# Patient Record
Sex: FEMALE | NOT HISPANIC OR LATINO | Employment: PART TIME | URBAN - METROPOLITAN AREA
[De-identification: names, ages, dates, MRNs, and addresses within clinical notes are randomized per-mention and may not be internally consistent; named-entity substitution may affect disease eponyms.]

---

## 2021-08-27 ENCOUNTER — TRANSFERRED RECORDS (OUTPATIENT)
Dept: HEALTH INFORMATION MANAGEMENT | Facility: CLINIC | Age: 31
End: 2021-08-27

## 2021-08-27 LAB — TSH SERPL-ACNC: 0.14 UIU/ML (ref 0.36–3.74)

## 2021-09-01 ENCOUNTER — TRANSFERRED RECORDS (OUTPATIENT)
Dept: HEALTH INFORMATION MANAGEMENT | Facility: CLINIC | Age: 31
End: 2021-09-01

## 2021-09-01 LAB — PAP SMEAR - HIM PATIENT REPORTED: NEGATIVE

## 2021-09-10 ENCOUNTER — OFFICE VISIT (OUTPATIENT)
Dept: ENDOCRINOLOGY | Facility: CLINIC | Age: 31
End: 2021-09-10
Payer: COMMERCIAL

## 2021-09-10 VITALS — DIASTOLIC BLOOD PRESSURE: 68 MMHG | WEIGHT: 111.3 LBS | SYSTOLIC BLOOD PRESSURE: 128 MMHG | HEART RATE: 84 BPM

## 2021-09-10 DIAGNOSIS — E06.3 HYPOTHYROIDISM DUE TO HASHIMOTO'S THYROIDITIS: Primary | ICD-10-CM

## 2021-09-10 DIAGNOSIS — Z3A.09 9 WEEKS GESTATION OF PREGNANCY: ICD-10-CM

## 2021-09-10 PROCEDURE — 99204 OFFICE O/P NEW MOD 45 MIN: CPT | Performed by: INTERNAL MEDICINE

## 2021-09-10 RX ORDER — LEVOTHYROXINE SODIUM 88 UG/1
1 TABLET ORAL DAILY
COMMUNITY
End: 2021-10-25

## 2021-09-10 RX ORDER — LEVOTHYROXINE SODIUM 100 UG/1
1 TABLET ORAL DAILY
COMMUNITY
End: 2021-10-25

## 2021-09-10 RX ORDER — VITAMIN A ACETATE, BETA CAROTENE, ASCORBIC ACID, CHOLECALCIFEROL, .ALPHA.-TOCOPHEROL ACETATE, DL-, THIAMINE MONONITRATE, RIBOFLAVIN, NIACINAMIDE, PYRIDOXINE HYDROCHLORIDE, FOLIC ACID, CYANOCOBALAMIN, CALCIUM CARBONATE, FERROUS FUMARATE, ZINC OXIDE, CUPRIC OXIDE 3080; 12; 120; 400; 1; 1.84; 3; 20; 22; 920; 25; 200; 27; 10; 2 [IU]/1; UG/1; MG/1; [IU]/1; MG/1; MG/1; MG/1; MG/1; MG/1; [IU]/1; MG/1; MG/1; MG/1; MG/1; MG/1
1 TABLET, FILM COATED ORAL
COMMUNITY
Start: 2021-08-05 | End: 2022-08-05

## 2021-09-10 NOTE — PROGRESS NOTES
Subjective:    New patient    Neetu De Anda is a 31 year old female who presents to discuss management of hypothyroidism in the context of pregnancy.     Currently pregnant (first pregnancy) around 9-10 weeks, with an estimated due date of 4/6/2022.     She has been on thyroid hormone for at least 2-3 years. She was diagnosed with hypothyroidism several years ago with an elevated TSH after presenting with weight gain. She was immediately started on LT4 100 mcg once daily, 7 days per week and after 1 year this was changed to 6 days per week. She had therefore chronically been on levothyroxine 100 mcg once daily, 6 days per week.     No prior H/N radiation. No neck surgery. No prior treatment with radioactive iodine. Never had a thyroid US or known thyroid nodularity. She has no known comorbidities.     Her mother also has hypothyroidism.     She believes her TSH around 2/2020 was 0.32 and at that time she was on LT4 100 mcg 6 days per week, this was the most recent lab check of TSH prior to 8/27/2021.     Following labs done 8/27/2021, labs done about 8 hours after she took LT4:        Following these labs, around 9/3/2021 her dose of levothyroxine was adjusted to 88 mcg once daily, 7 days per week. This was a slight dose increase to 616 mcg/week instead of her previous dose of 600 mcg/week. She takes levothyroxine appropriately to maximize absorption.    She is currently feeling well in the context of her pregnancy.     No tobacco/alcohol use.     Objective:    Appears well. No thyroid eye disease. No surgical scar in the anterior neck. Thyroid examined and atrophic without nodularity. No cervical lymphadenopathy. HR normal rate and rhythm.     Assessment/Plan:    # Hypothyroidism  # Active pregnancy, gestational age around 9-10 weeks    She has had hypothyroidism for the past several years and had been on a stable dose of levothyroxine 100 mcg 6 days/week.  She is currently pregnant and estimates her gestational age  around 9-10 weeks.  OB/GYN care is outside of our system but I was able to review some outside records.  Step 1 is determining the etiology of her hypothyroidism, which I suspect will be autoimmune in nature.  I have ordered a thyroperoxidase antibody.  Step 2 is making sure she is on an appropriate dose of thyroid hormone.  We discussed the physiology of thyroid function during pregnancy and the increased need for thyroid hormone during pregnancy.  We will start with repeat TSH with her upcoming draw next week Friday.  Will subsequently make dose adjustments as needed and I will follow her during her pregnancy.  For now she will continue levothyroxine 88 mcg once daily, 7 days/week.  We discussed the appropriate way to take this medication to maximize absorption, which she is doing.    45 minutes spent on the date of the encounter doing chart review, history and exam, documentation and further activities as noted above.

## 2021-09-10 NOTE — LETTER
9/10/2021         RE: Neetu De Anda  [address] [facility] [address]        Dear Colleague,    Thank you for referring your patient, Neetu De Anda, to the [facility]. Please see a copy of my visit note below.    Subjective:    New patient    Neetu De Anda is a 31 year old female who presents to discuss management of hypothyroidism in the context of pregnancy.     Currently pregnant (first pregnancy) around 9-10 weeks, with an estimated due date of 4/6/2022.     She has been on thyroid hormone for at least 2-3 years. She was diagnosed with hypothyroidism several years ago with an elevated TSH after presenting with weight gain. She was immediately started on LT4 100 mcg once daily, 7 days per week and after 1 year this was changed to 6 days per week. She had therefore chronically been on levothyroxine 100 mcg once daily, 6 days per week.     No prior H/N radiation. No neck surgery. No prior treatment with radioactive iodine. Never had a thyroid US or known thyroid nodularity. She has no known comorbidities.     Her mother also has hypothyroidism.     She believes her TSH around 2/2020 was 0.32 and at that time she was on LT4 100 mcg 6 days per week, this was the most recent lab check of TSH prior to 8/27/2021.     Following labs done 8/27/2021, labs done about 8 hours after she took LT4:        Following these labs, around 9/3/2021 her dose of levothyroxine was adjusted to 88 mcg once daily, 7 days per week. This was a slight dose increase to 616 mcg/week instead of her previous dose of 600 mcg/week. She takes levothyroxine appropriately to maximize absorption.    She is currently feeling well in the context of her pregnancy.     No tobacco/alcohol use.     Objective:    Appears well. No thyroid eye disease. No surgical scar in the anterior neck. Thyroid examined and atrophic without nodularity. No cervical lymphadenopathy. HR normal rate and rhythm.     Assessment/Plan:    #  Hypothyroidism  # Active pregnancy, gestational age around 9-10 weeks    She has had hypothyroidism for the past several years and had been on a stable dose of levothyroxine 100 mcg 6 days/week.  She is currently pregnant and estimates her gestational age around 9-10 weeks.  OB/GYN care is outside of our system but I was able to review some outside records.  Step 1 is determining the etiology of her hypothyroidism, which I suspect will be autoimmune in nature.  I have ordered a thyroperoxidase antibody.  Step 2 is making sure she is on an appropriate dose of thyroid hormone.  We discussed the physiology of thyroid function during pregnancy and the increased need for thyroid hormone during pregnancy.  We will start with repeat TSH with her upcoming draw next week Friday.  Will subsequently make dose adjustments as needed and I will follow her during her pregnancy.  For now she will continue levothyroxine 88 mcg once daily, 7 days/week.  We discussed the appropriate way to take this medication to maximize absorption, which she is doing.    45 minutes spent on the date of the encounter doing chart review, history and exam, documentation and further activities as noted above.       Again, thank you for allowing me to participate in the care of your patient.        Sincerely,        Jose Anders MD

## 2021-09-17 ENCOUNTER — TELEPHONE (OUTPATIENT)
Dept: ENDOCRINOLOGY | Facility: CLINIC | Age: 31
End: 2021-09-17

## 2021-09-17 ENCOUNTER — TRANSFERRED RECORDS (OUTPATIENT)
Dept: HEALTH INFORMATION MANAGEMENT | Facility: CLINIC | Age: 31
End: 2021-09-17

## 2021-09-17 LAB — HEPATITIS B SURFACE ANTIGEN (EXTERNAL): NONREACTIVE

## 2021-09-17 NOTE — TELEPHONE ENCOUNTER
Called and left message for patient to return call. Patient saw Dr. Anders on 9/10/21 and was suppose to get labs drawn at her OBGYN clinic. Writer just wanted an update to see if she was able to get labs done.

## 2021-09-21 ENCOUNTER — DOCUMENTATION ONLY (OUTPATIENT)
Dept: ENDOCRINOLOGY | Facility: CLINIC | Age: 31
End: 2021-09-21

## 2021-09-21 ENCOUNTER — TELEPHONE (OUTPATIENT)
Dept: ENDOCRINOLOGY | Facility: CLINIC | Age: 31
End: 2021-09-21

## 2021-09-21 DIAGNOSIS — Z3A.09 9 WEEKS GESTATION OF PREGNANCY: ICD-10-CM

## 2021-09-21 DIAGNOSIS — E06.3 HYPOTHYROIDISM DUE TO HASHIMOTO'S THYROIDITIS: Primary | ICD-10-CM

## 2021-09-21 NOTE — TELEPHONE ENCOUNTER
Called and spoke with patient. Patient will check her schedule and call the clinic back to schedule her lab appt and follow up appt.     Patient will need a lab appointment in 4 weeks and then a follow up with Dr. Anders in 1-2 days.

## 2021-09-21 NOTE — PROGRESS NOTES
TSH returned at 2.1 on 9/17/2021.    She has been on levothyroxine 88 mcg once daily, 7 days per week.    She will increase to 88 mcg 8 tabs per week, so 2 days per week (ex. Wed. And Saturday she will take 1.5 tabs).    TSH and visit with me in 4 weeks.    I spoke with Neetu over the phone this afternoon to convey the plan.

## 2021-09-21 NOTE — TELEPHONE ENCOUNTER
Dr. Anders had ordered TSH and Thyroid peroxidase antibody on 9/10/21 but we only received the TSH result. Writer called and left [facility] medical records to fax the Thyroid peroxidase antibody result over to [phone].

## 2021-09-29 NOTE — TELEPHONE ENCOUNTER
Date: 10/21/2021 Status: Scheduled   Time: 8:00 AM Length: 15   Visit Type: MYCHART LAB VISIT [833] Copay: $0.00   Provider: [facility]

## 2021-10-06 NOTE — TELEPHONE ENCOUNTER
Date: 10/25/2021 Status: Scheduled   Time: 3:00 PM Length: 30   Visit Type: RETURN ENDOCRINE [[id number]] Copay: $0.00   Provider: Jose Anders MD [facility]   Bill Area: Endocrinology [facility]

## 2021-10-17 ENCOUNTER — HEALTH MAINTENANCE LETTER (OUTPATIENT)
Age: 31
End: 2021-10-17

## 2021-10-21 ENCOUNTER — LAB (OUTPATIENT)
Dept: LAB | Facility: CLINIC | Age: 31
End: 2021-10-21
Payer: COMMERCIAL

## 2021-10-21 DIAGNOSIS — Z3A.09 9 WEEKS GESTATION OF PREGNANCY: ICD-10-CM

## 2021-10-21 DIAGNOSIS — E06.3 HYPOTHYROIDISM DUE TO HASHIMOTO'S THYROIDITIS: ICD-10-CM

## 2021-10-21 PROCEDURE — 84443 ASSAY THYROID STIM HORMONE: CPT

## 2021-10-21 PROCEDURE — 36415 COLL VENOUS BLD VENIPUNCTURE: CPT

## 2021-10-21 PROCEDURE — 86376 MICROSOMAL ANTIBODY EACH: CPT

## 2021-10-22 LAB
THYROPEROXIDASE AB SERPL-ACNC: 27 IU/ML
TSH SERPL DL<=0.005 MIU/L-ACNC: 3.9 MU/L (ref 0.4–4)

## 2021-10-25 ENCOUNTER — VIRTUAL VISIT (OUTPATIENT)
Dept: ENDOCRINOLOGY | Facility: CLINIC | Age: 31
End: 2021-10-25
Payer: COMMERCIAL

## 2021-10-25 DIAGNOSIS — E06.3 HYPOTHYROIDISM DUE TO HASHIMOTO'S THYROIDITIS: Primary | ICD-10-CM

## 2021-10-25 DIAGNOSIS — Z3A.16 16 WEEKS GESTATION OF PREGNANCY: ICD-10-CM

## 2021-10-25 PROCEDURE — 99213 OFFICE O/P EST LOW 20 MIN: CPT | Mod: 95 | Performed by: INTERNAL MEDICINE

## 2021-10-25 RX ORDER — LEVOTHYROXINE SODIUM 88 UG/1
TABLET ORAL
Qty: 90 TABLET | Refills: 3 | Status: SHIPPED | OUTPATIENT
Start: 2021-10-25 | End: 2021-12-06

## 2021-10-25 NOTE — PROGRESS NOTES
Phone visit    Start time 2:58, stop time 3:10; additional 10 minutes spent on the date of the encounter doing chart review, documentation and further activities as noted.     Provider location: [facility], [address] [place] [address] [address] [id number]    Patient location: patient home    Mode of transmission: phone    Subjective:    Established patient    Neetu De Anda is a 31 year old female who presents to review hypothyroidism in pregnancy.    Her prepregnancy dose of levothyroxine was 600 mcg/week.  On September 21, 2021 when she was around 11/12 weeks gestational age we increased the dose of levothyroxine to 704 mcg/week (due to TSH of 2.1 on 9/17/2021) and she achieved this by taking levothyroxine 88 mcg 8 tablets/week, which she has done to date.    She has not missed any doses and she takes levothyroxine appropriately to maximize absorption.  She does take a prenatal multivitamin and another vitamin but takes these at least 4 hours after taking thyroid hormone.  TSH on 10/21/2021 was 3.9 and TPO Ab was normal.    Pregnancy has been uncomplicated.  No nausea, vomiting, or GERD.    Objective:    Phone visit.    Assessment/Plan:    # Hypothyroidism  # Active pregnancy, gestational age around 16 weeks     With her TSH of 3.9 we will slightly increase her dose of thyroid hormone.  She will increase from levothyroxine 88 mcg 8 tablets/week to levothyroxine 88 mcg 9 tablets/week.  2 days/week, example Wednesday and Saturday, she will take 2 tablets each morning.  The rest of the days per week she will take 1 tablet.    We will recheck her TSH in 4 weeks with a return visit.    We again reviewed the appropriate way to take thyroid hormone to maximize absorption, which she is doing.  We discussed that if she develops GERD and starts taking Tums or an antacid she should also take these 4 hours apart from thyroid hormone.

## 2021-10-25 NOTE — LETTER
10/25/2021         RE: Neetu De Anda  [address] [place] [facility] [address]        Dear Colleague,    Thank you for referring your patient, Neetu De Anda, to the [facility]. Please see a copy of my visit note below.    Phone visit    Start time 2:58, stop time 3:10; additional 10 minutes spent on the date of the encounter doing chart review, documentation and further activities as noted.     Provider location: [facility], [address] [place] [id number]    Patient location: patient home    Mode of transmission: phone    Subjective:    Established patient    Neetu De Anda is a 31 year old female who presents to review hypothyroidism in pregnancy.    Her prepregnancy dose of levothyroxine was 600 mcg/week.  On September 21, 2021 when she was around 11/12 weeks gestational age we increased the dose of levothyroxine to 704 mcg/week (due to TSH of 2.1 on 9/17/2021) and she achieved this by taking levothyroxine 88 mcg 8 tablets/week, which she has done to date.    She has not missed any doses and she takes levothyroxine appropriately to maximize absorption.  She does take a prenatal multivitamin and another vitamin but takes these at least 4 hours after taking thyroid hormone.  TSH on 10/21/2021 was 3.9 and TPO Ab was normal.    Pregnancy has been uncomplicated.  No nausea, vomiting, or GERD.    Objective:    Phone visit.    Assessment/Plan:    # Hypothyroidism  # Active pregnancy, gestational age around 16 weeks     With her TSH of 3.9 we will slightly increase her dose of thyroid hormone.  She will increase from levothyroxine 88 mcg 8 tablets/week to levothyroxine 88 mcg 9 tablets/week.  2 days/week, example Wednesday and Saturday, she will take 2 tablets each morning.  The rest of the days per week she will take 1 tablet.    We will recheck her TSH in 4 weeks with a return visit.    We again reviewed the appropriate way to take thyroid hormone to maximize  absorption, which she is doing.  We discussed that if she develops GERD and starts taking Tums or an antacid she should also take these 4 hours apart from thyroid hormone.      Again, thank you for allowing me to participate in the care of your patient.        Sincerely,        Jose Anders MD

## 2021-11-20 DIAGNOSIS — E06.3 HYPOTHYROIDISM DUE TO HASHIMOTO'S THYROIDITIS: ICD-10-CM

## 2021-11-24 ENCOUNTER — LAB (OUTPATIENT)
Dept: LAB | Facility: CLINIC | Age: 31
End: 2021-11-24
Payer: COMMERCIAL

## 2021-11-24 DIAGNOSIS — E06.3 HYPOTHYROIDISM DUE TO HASHIMOTO'S THYROIDITIS: ICD-10-CM

## 2021-11-24 DIAGNOSIS — Z3A.16 16 WEEKS GESTATION OF PREGNANCY: ICD-10-CM

## 2021-11-24 LAB — TSH SERPL DL<=0.005 MIU/L-ACNC: 1.96 MU/L (ref 0.4–4)

## 2021-11-24 PROCEDURE — 84443 ASSAY THYROID STIM HORMONE: CPT

## 2021-11-24 PROCEDURE — 36415 COLL VENOUS BLD VENIPUNCTURE: CPT

## 2021-11-26 RX ORDER — LEVOTHYROXINE SODIUM 88 UG/1
TABLET ORAL
Qty: 36 TABLET | Refills: 9 | OUTPATIENT
Start: 2021-11-26

## 2022-01-12 ENCOUNTER — LAB (OUTPATIENT)
Dept: LAB | Facility: CLINIC | Age: 32
End: 2022-01-12
Payer: COMMERCIAL

## 2022-01-12 DIAGNOSIS — E06.3 HYPOTHYROIDISM DUE TO HASHIMOTO'S THYROIDITIS: ICD-10-CM

## 2022-01-12 DIAGNOSIS — Z3A.09 9 WEEKS GESTATION OF PREGNANCY: ICD-10-CM

## 2022-01-12 LAB — TSH SERPL DL<=0.005 MIU/L-ACNC: 0.6 MU/L (ref 0.4–4)

## 2022-01-12 PROCEDURE — 84443 ASSAY THYROID STIM HORMONE: CPT

## 2022-01-12 PROCEDURE — 36415 COLL VENOUS BLD VENIPUNCTURE: CPT

## 2022-01-17 DIAGNOSIS — E06.3 HYPOTHYROIDISM DUE TO HASHIMOTO'S THYROIDITIS: Primary | ICD-10-CM

## 2022-01-21 ENCOUNTER — TRANSFERRED RECORDS (OUTPATIENT)
Dept: HEALTH INFORMATION MANAGEMENT | Facility: CLINIC | Age: 32
End: 2022-01-21
Payer: COMMERCIAL

## 2022-01-25 ENCOUNTER — TELEPHONE (OUTPATIENT)
Dept: ADMINISTRATIVE | Facility: CLINIC | Age: 32
End: 2022-01-25
Payer: COMMERCIAL

## 2022-01-25 NOTE — TELEPHONE ENCOUNTER
Diabetes Education Scheduling Outreach #1:    Call to patient to schedule. Left message with phone number to call to schedule.    Plan for 2nd outreach attempt within 1 business day.    Raeann Alonso OnCall  Diabetes and Nutrition Scheduling

## 2022-02-03 ENCOUNTER — VIRTUAL VISIT (OUTPATIENT)
Dept: EDUCATION SERVICES | Facility: CLINIC | Age: 32
End: 2022-02-03
Payer: COMMERCIAL

## 2022-02-03 DIAGNOSIS — O24.419 GDM (GESTATIONAL DIABETES MELLITUS): Primary | ICD-10-CM

## 2022-02-03 PROCEDURE — G0108 DIAB MANAGE TRN  PER INDIV: HCPCS | Mod: 95

## 2022-02-03 NOTE — PATIENT INSTRUCTIONS
Your 1 week follow-up Diabetes Education visit is scheduled on Wednesday, February 9th, in person, at the [facility] at 7:30am.    1. Check blood sugar 4 times a day, before breakfast and 1 hour after the start of each meal.   Blood glucose goal before breakfast: <95 mg/dL  1 hour after start of meals:  <140 mg/dL    2. Check urine ketones when you wake up every morning for 7 days. If negative everyday, reduce testing to once a week.    3. Follow the recommended meal plan: eat something every 2-3 hours, include protein/fat and carbohydrate at every meal and snack, have 30-45 gm carbs at breakfast, 45-60 gm carbs at lunch, 45-60 gm carbs at supper, 15-30 gm carbs at 3 snacks per day.     Other tips:  -Minimum 175 gm carbohydrates a day recommended during pregnancy.  -Caution fruit, cereal and juice in the morning- may cause a high blood glucose.  -Good to eat protein with bedtime snack   -We recommend keeping track of meals the next 1-2 weeks so you can send for us to review if you are having some higher blood glucose.    Helpful website: calorieking.com    4. Add activity to every day, try walking or being active after each meal to help control blood sugar levels.    5.Call or send a [employer] message to your educator if 3 or more blood sugars are above goal in 1 week, you have an elevated ketone result (trace or higher), or with questions or concerns.    Eva Dunham,  MICHELLE, LD, [facility]   [phone]

## 2022-02-03 NOTE — PROGRESS NOTES
At 10:06am, text and email invite sent. Patient not already joined in video visit by 10:05am.  Joined at 10:08am edn 11:09am    Type of service:  Video Visit    If the video visit is dropped, the video visit invitation should be resent by: Send to e-mail at: [email or web address]    Originating Location (pt. Location): Home  Distant Location (provider location): Home  Mode of Communication:  Video Conference via [employer]    Video Start Time: 10:08am  Video End Time (time video stopped): 11:09am    How would patient like to obtain AVS? [facility]      Diabetes Self-Management Education & Support    SUBJECTIVE/OBJECTIVE:  Presents for education related to gestational diabetes.    Accompanied by: Self,Spouse  Diabetes management related comments/concerns: Says she is checking BG 4 times a day.  She saw a higher BG after having a booster shot.  Says checking BG 4x/day.    Says cut back on chapatis, eat more meat and pulses/whole grains and now eating oat bread and less sugar.  Says she used to drink 1 cup milk with protein powder.      Wt: 51kg and now 59 kg    Gestational weeks: 31 weeks  Number of previous pregnancies: 0  Had any babies over 9 lbs: No  Previously had Gestational Diabetes: No  Have you ever had thyroid problems or taken thyroid medication?: (!) Yes  Heart disease, mitral valve prolapse or rheumatic fever?: No  Hypertension : No  High Cholesterol: No  High Triglycerides: No  Do you use tobacco products?: No  Do you drink beer, wine or hard liquor?: No    Cultural Influences/Ethnic Background:  Choose not to answer    Estimated Date of Delivery: April 6, 2022    1 hour OGTT  No results found for: GLU1      3 hour OGTT    Fasting  No results found for: GTTGF    1 hour  No results found for: GTTG1    2 hour  No results found for: GTTG2    3 hour  No results found for: GTTG3    Lifestyle and Health Behaviors:  Exercise:: Yes  Days per week of moderate to strenuous exercise (like a brisk walk): 5  On  average, minutes per day of exercise at this level: 20 (20 minutes AM, 10 minutes after lunch and 20 minutes evening.)  How intense was your typical exercise? : Moderate (like brisk walking)  Exercise Minutes per Week: 100  Barrier to exercise: None  Cultural/Rastafari diet restrictions?: Yes (Not eat beef or pork)  Meal planning/habits: Low carb  Meals include: Breakfast,Lunch,Dinner,Morning Snack,Afternoon Snack,Evening Snack  Breakfast: Wakes: 7-7:30am: B: soaked almonds, raisins and walnuts. 9-10am: oats OR egg with 1 slice oat bread and water  Lunch: meat and 1 chapatis, 1 cup cooked maddison and okra and water  Dinner: meat with 1 chaptis, 1 cup cooked maddison and okra  gm(2/3 cup) cooked rice, 1 cup pulse, green veggie and meat  OR rice and allen and water  Snacks: fruit OR yogurt with blueberry and 1 apple OR apodaca nuts  Other: milk and protein powder  Beverages: Water,Milk,Tea (Rod on occasion)  Biggest challenges to healthy eating: None  Pre-juan carlos vitamin?: Yes  Supplements?: Yes  List supplements currently taking: Slow Fe  Experiencing nausea?: No  Experiencing heartburn?: No    Healthy Coping:  Emotional response to diabetes: Ready to learn,Acceptance  Informal Support system:: Spouse  Stage of change: ACTION (Actively working towards change)    Current Management:  Taking medications for gestational diabetes?: No  Difficulty affording diabetes testing supplies?: No    ASSESSMENT:  Reviewed target blood glucose values, sharps disposal, diagnosis criteria for GDM and importance of good blood glucose management for health of mom and baby. Patient advised to call if 3 blood glucose elevated before returns next week. Instructed on ketone checking and told to call if unable to get ketones negative.      Discussed carbohydrate sources and impact on blood glucose. Reviewed basics of healthy eating and incorporating a variety of foods into meal plan. Instructed on carbohydrate counting and label reading and  recommended patient consume 2-3 CHO for breakfast, 3-4 CHO for lunch and dinner and 1-2 CHO for each snack, 3 snacks a day.  Answered her food related questions and discussed different meal ideas to include the foods she normally eats as she was cutting out rice due to concerns she should not be eating this.  Whole grains were encouraged as able as she asks about quinoa and millet.    Discussed importance of not going too low in carbohydrates since that may cause liver to produce excess glucose and contribute to elevated blood glucose readings and ketone formation. Encouraged eating breakfast within 1 hour of waking.  To also help prevent against ketone formation, protein was encouraged with meals and snacks, especially with the night snack. Reviewed benefits of exercising to help lower blood glucose and walking after meals, as tolerated and per MD approval, if seeing elevated blood glucose after a meal. Pt verbalized understanding of concepts discussed and recommendations provided.        INTERVENTION:  Patient was already using the Accu-Chek Guide meter and provided blood glucose values:      Blood Glucose/Ketone Log:    Date Fasting 1 hr Post Breakfast 1 hr Post Lunch 1 hr Post Supper   2/3 87      2/2 87 125 100 105   2/1 84 122 136 119   1/31 87 130 107 139   1/30 105 128 85 102   1/29 87 120 108 151 (booster)   1/28  139 145 125       So far, seeing most blood glucose in target- 83% of the time for fasting, post-lunch and dinner and 100% post-breakfast.    Educational topics covered today:  GDM diagnosis, pathophysiology, Risks and Complications of GDM, Means of controlling GDM, Using a Blood Glucose Monitor, Blood Glucose Goals, Logging and Interpreting Glucose Results, Ketone Testing, When to Call a Diabetes Educator or OB Provider, Healthy Eating During Pregnancy, Counting Carbohydrates, Meal Planning for GDM, and Physical Activity    Educational materials provided today:   [place] Understanding  Gestational Diabetes  GDM Log Book  Sharps Disposal  (email received)  Sent Carbohydrate Information on Asian  foods by [employer]    Pt verbalized understanding of concepts discussed and recommendations provided today.     PLAN:  Check glucose 4 times daily, before breakfast and 1 hour after each meal.     Check Ketones daily for one week, if negative, reduce testing to once a week.     Physical activity recommended: after meals if blood glucose high, as tolerated and per MD approval.    Meal plan: 2-3 carbs at breakfast, 3-4 carbs at lunch, 3-4 carbs at supper, 1-2 carbs at 3 snacks a day.  Follow consistent CHO meal plan, eat CHO and protein/fat at all meals/snacks.    Call/e-mail/[employer] message diabetes educator if 3 or more blood sugars are above the goal in 1 week, if ketones are positive, or with questions/concerns.    Eva Dunham RDN, LD, [facility]   Time Spent: 61 minutes  Encounter Type: Individual    Any diabetes medication dose changes were made via the CDE Protocol and Collaborative Practice Agreement with the patient's referring provider. A copy of this encounter was shared with the provider.

## 2022-02-09 ENCOUNTER — ALLIED HEALTH/NURSE VISIT (OUTPATIENT)
Dept: EDUCATION SERVICES | Facility: CLINIC | Age: 32
End: 2022-02-09
Payer: COMMERCIAL

## 2022-02-09 DIAGNOSIS — O24.419 GDM (GESTATIONAL DIABETES MELLITUS): Primary | ICD-10-CM

## 2022-02-09 PROCEDURE — G0108 DIAB MANAGE TRN  PER INDIV: HCPCS

## 2022-02-09 NOTE — PROGRESS NOTES
Diabetes Self-Management Education & Support    SUBJECTIVE/OBJECTIVE:  Presents for education related to gestational diabetes.    Diabetes management related comments/concerns: No    Cultural Influences/Ethnic Background:  Choose not to answer    There were no vitals taken for this visit.      Estimated Date of Delivery: Data Unavailable    Blood Glucose/Ketone Log:   Date Ketones Fasting Post Breakfast Post Lunch Post Supper   2/4 - - 125 99 110   2/5 n 84 119 105 107   2/6 n 80 125 140 107   2/7 tr 88 135 112 105   2/8 n 90 113 132 145   2/9 n 86                  Lifestyle and Health Behaviors:  Days per week of moderate to strenuous exercise (like a brisk walk): 0  On average, minutes per day of exercise at this level: 60  How intense was your typical exercise? : Light (like stretching or slow walking)  Exercise Minutes per Week: 0  Barrier to exercise: None,Safety,Physical limitation  Cultural/Lutheran diet restrictions?: Yes  Meal planning/habits: Carb counting,Low carb,Smaller portions,Frequent snacking  How many times a week on average do you eat food made away from home (restaurant/take-out)?: 0  Meals include: Breakfast,Lunch,Dinner,Morning Snack,Afternoon Snack,Evening Snack  Beverages: Water,Milk          Healthy Coping:  Informal Support system:: Family,Spouse    Current Management:       ASSESSMENT:  Ketones: net-trace.   Fasting blood glucoses: 100% in target.  After breakfast: 100% in target.  After lunch: 100% in target.  After dinner: 80% in target.    Glucose in goal, she did have one high reading after dinner but is able to identify having several snacks prior to her dinner meal may of contributed to the higher reading.   She is following the meal plan, but lower carbohydrates at some meals then having additional snacks. Bedtime is 15 grams of carbohydrate. At this time her fasting glucose is in goal and ketones are normal. Discussed with her the benefit of adequate bedtime snack especially as  the pregnancy progresses.     INTERVENTION:  Educational topics covered today:  What to expect after delivery, Future testing for Type 2 diabetes (2 hour OGTT at 6 week post-partum check-up and annual fasting blood glucose level), Risk of GDM and planning ahead for future pregnancies, Recommended lifestyle interventions for reducing the risk of Type 2 Diabetes, When to Call a Diabetes Educator or OB Provider    Educational Materials provided today:  [facility] Preventing Diabetes    PLAN:  Check glucose 4 times daily.  Check ketones once a week when readings are consistently negative.  Continue with recommended physical activity.  Continue to follow recommended meal plan: 2-3 carbs at breakfast, 3-4 carbs at lunch, 3-4 carbs at supper, 1-2 carbs at snacks.  Follow consistent CHO meal plan, eat CHO and protein/fat at all meals/snacks.    Call/e-mail/MyChart message diabetes educator if 3 or more blood sugars are above the goal in 1 week or if ketones are positive.    Angelina Yuen RN, [facility]    Time Spent: 40 minutes  Encounter Type: Individual    Any diabetes medication dose changes were made via the CDE Protocol and Collaborative Practice Agreement with the patient's referring provider. A copy of this encounter was shared with the provider.

## 2022-02-09 NOTE — LETTER
2/9/2022         RE: Neetu De Anda  [address]  [place] [address]        Dear Colleague,    Thank you for referring your patient, Neetu De Anda, to the [facility]. Please see a copy of my visit note below.    Diabetes Self-Management Education & Support    SUBJECTIVE/OBJECTIVE:  Presents for education related to gestational diabetes.    Diabetes management related comments/concerns: No    Cultural Influences/Ethnic Background:  Choose not to answer    There were no vitals taken for this visit.      Estimated Date of Delivery: Data Unavailable    Blood Glucose/Ketone Log:   Date Ketones Fasting Post Breakfast Post Lunch Post Supper   2/4 - - 125 99 110   2/5 n 84 119 105 107   2/6 n 80 125 140 107   2/7 tr 88 135 112 105   2/8 n 90 113 132 145   2/9 n 86                  Lifestyle and Health Behaviors:  Days per week of moderate to strenuous exercise (like a brisk walk): 0  On average, minutes per day of exercise at this level: 60  How intense was your typical exercise? : Light (like stretching or slow walking)  Exercise Minutes per Week: 0  Barrier to exercise: None,Safety,Physical limitation  Cultural/Pentecostal diet restrictions?: Yes  Meal planning/habits: Carb counting,Low carb,Smaller portions,Frequent snacking  How many times a week on average do you eat food made away from home (restaurant/take-out)?: 0  Meals include: Breakfast,Lunch,Dinner,Morning Snack,Afternoon Snack,Evening Snack  Beverages: Water,Milk          Healthy Coping:  Informal Support system:: Family,Spouse    Current Management:       ASSESSMENT:  Ketones: net-trace.   Fasting blood glucoses: 100% in target.  After breakfast: 100% in target.  After lunch: 100% in target.  After dinner: 80% in target.    Glucose in goal, she did have one high reading after dinner but is able to identify having several snacks prior to her dinner meal may of contributed to the higher reading.   She is following the meal  plan, but lower carbohydrates at some meals then having additional snacks. Bedtime is 15 grams of carbohydrate. At this time her fasting glucose is in goal and ketones are normal. Discussed with her the benefit of adequate bedtime snack especially as the pregnancy progresses.     INTERVENTION:  Educational topics covered today:  What to expect after delivery, Future testing for Type 2 diabetes (2 hour OGTT at 6 week post-partum check-up and annual fasting blood glucose level), Risk of GDM and planning ahead for future pregnancies, Recommended lifestyle interventions for reducing the risk of Type 2 Diabetes, When to Call a Diabetes Educator or OB Provider    Educational Materials provided today:  [facility] Preventing Diabetes    PLAN:  Check glucose 4 times daily.  Check ketones once a week when readings are consistently negative.  Continue with recommended physical activity.  Continue to follow recommended meal plan: 2-3 carbs at breakfast, 3-4 carbs at lunch, 3-4 carbs at supper, 1-2 carbs at snacks.  Follow consistent CHO meal plan, eat CHO and protein/fat at all meals/snacks.    Call/e-mail/MyChart message diabetes educator if 3 or more blood sugars are above the goal in 1 week or if ketones are positive.    Angelina Yuen RN, [facility]    Time Spent: 40 minutes  Encounter Type: Individual    Any diabetes medication dose changes were made via the CDE Protocol and Collaborative Practice Agreement with the patient's referring provider. A copy of this encounter was shared with the provider.

## 2022-02-18 ENCOUNTER — MYC MEDICAL ADVICE (OUTPATIENT)
Dept: NUTRITION | Facility: CLINIC | Age: 32
End: 2022-02-18
Payer: COMMERCIAL

## 2022-02-21 NOTE — TELEPHONE ENCOUNTER
Gestational Diabetes Follow-up    Subjective/Objective:    Neetu De Anda sent in blood glucose log for review. Last date of communication was: 2-9-22.    Gestational diabetes is being managed with diet and activity    Taking diabetes medications: no    Estimated Date of Delivery: 4-6-22    BG/Food Log:         Assessment:    Ketones: Negative.   Fasting blood glucoses: 100% in target.  After breakfast: 90% in target.  Before lunch: x% in target.  After lunch: 100% in target.  Before dinner: x% in target.  After dinner: 100% in target.    Plan/Response:  Reduce ketone checks to once a week.  Follow-up in 2 weeks.  MyChart response sent    Vijaya Perez RN, [facility]      Any diabetes medication dose changes were made via the CDE Protocol and Collaborative Practice Agreement with the patient's OB/GYN provider. A copy of this encounter was shared with the provider.

## 2022-02-25 ENCOUNTER — HOSPITAL ENCOUNTER (OUTPATIENT)
Age: 32
Setting detail: OBSERVATION
End: 2022-02-25
Payer: COMMERCIAL

## 2022-03-04 ENCOUNTER — MYC MEDICAL ADVICE (OUTPATIENT)
Dept: NUTRITION | Facility: CLINIC | Age: 32
End: 2022-03-04
Payer: COMMERCIAL

## 2022-03-04 NOTE — TELEPHONE ENCOUNTER
Gestational Diabetes Follow-up    Subjective/Objective:    Neetu De Anda sent in blood glucose log for review. Last date of communication was: 2/18/22.    Gestational diabetes is being managed with diet and activity    Taking diabetes medications: no    Estimated Date of Delivery: Data Unavailable 4/6/22    BG/Food Log:           Assessment:  Blood sugars in target, no changes needed.     Ketones: negative.   Fasting blood glucoses: 94% in target.  After breakfast: 100% in target.  After lunch: 100% in target.  After dinner: 100% in target.    Plan/Response:  No changes in the patient's current treatment plan.  Follow-up in 2 weeks.    Oxana Yi MS, RD, LD, CDE    Any diabetes medication dose changes were made via the CDE Protocol and Collaborative Practice Agreement with the patient's OB/GYN provider. A copy of this encounter was shared with the provider.

## 2022-03-21 ENCOUNTER — MYC MEDICAL ADVICE (OUTPATIENT)
Dept: NUTRITION | Facility: CLINIC | Age: 32
End: 2022-03-21
Payer: COMMERCIAL

## 2022-03-22 NOTE — TELEPHONE ENCOUNTER
Gestational Diabetes Follow-up    Subjective/Objective:    Neetu De Anda sent in blood glucose log for review. Last date of communication was: 3/4/2022.    Gestational diabetes is being managed with diet and activity    Taking diabetes medications: no    Estimated Date of Delivery: Data Unavailable    BG/Food Log:         Assessment:    Ketones: neg.   Fasting blood glucoses: 100% in target.  After breakfast: 100% in target.  After lunch: 100% in target.  After dinner: 100% in target.    Plan/Response:  No changes in the patient's current treatment plan.  Follow-up in 2 weeks.    HERMES Lovell CDCES    Any diabetes medication dose changes were made via the CDE Protocol and Collaborative Practice Agreement with the patient's OB/GYN provider. A copy of this encounter was shared with the provider.

## 2022-03-28 ENCOUNTER — TELEPHONE (OUTPATIENT)
Dept: EDUCATION SERVICES | Facility: CLINIC | Age: 32
End: 2022-03-28
Payer: COMMERCIAL

## 2022-03-28 NOTE — TELEPHONE ENCOUNTER
Neetu De Anda Diab Ed-Patient Care  Today morning I got ketones pink in color, I think it s 15g     Do I need to do anything about it.   Is this bad for baby?     Neetu Andrew Response:  Wiley De Anda,    Thank you for reaching out about the small ketones (15). When you have ketones it means that you are not getting in enough carbohydrates. If you think you are still getting enough during the day then you could add a small carbohydrate snack in the middle of the night. Most patients do this when they get up to use the bathroom. Usually 10-15 grams of carbs is enough. You could try a glass of milk, small granola bar, or some crackers with PB or cheese.    The ketones are not hurting baby. When they are positive like you had this morning it is just your body's way of letting you know you need more carbohydrates.

## 2022-04-06 ENCOUNTER — MYC MEDICAL ADVICE (OUTPATIENT)
Dept: NUTRITION | Facility: CLINIC | Age: 32
End: 2022-04-06
Payer: COMMERCIAL

## 2022-04-07 NOTE — TELEPHONE ENCOUNTER
Gestational Diabetes Follow-up    Subjective/Objective:    Neetu De Anda sent in blood glucose log for review. Last date of communication was: 3/28/22.    Gestational diabetes is being managed with diet and activity    Taking diabetes medications: no    Estimated Date of Delivery: Data Unavailable- 4/6/2022    BG/Food Log:   Seem message    Assessment:  Blood sugars in target and patient is past her due date.     Ketones: trace.   Fasting blood glucoses: 100% in target.  After breakfast: 100% in target.  After lunch: 100% in target.  After dinner: 100% in target.    Plan/Response:  No changes in the patient's current treatment plan.    No follow up needed unless questions.    Oxana Yi MS, RD, LD, CDE      Any diabetes medication dose changes were made via the CDE Protocol and Collaborative Practice Agreement with the patient's OB/GYN provider. A copy of this encounter was shared with the provider.

## 2022-04-11 ENCOUNTER — APPOINTMENT (OUTPATIENT)
Dept: ULTRASOUND IMAGING | Facility: CLINIC | Age: 32
End: 2022-04-11
Attending: OBSTETRICS & GYNECOLOGY
Payer: COMMERCIAL

## 2022-04-11 ENCOUNTER — HOSPITAL ENCOUNTER (INPATIENT)
Facility: CLINIC | Age: 32
LOS: 3 days | Discharge: HOME | End: 2022-04-14
Attending: OBSTETRICS & GYNECOLOGY | Admitting: OBSTETRICS & GYNECOLOGY
Payer: COMMERCIAL

## 2022-04-11 DIAGNOSIS — O36.4XX0 IUFD AT 20 WEEKS OR MORE OF GESTATION: Primary | ICD-10-CM

## 2022-04-11 PROBLEM — O36.8190 DECREASED FETAL MOVEMENT: Status: ACTIVE | Noted: 2022-04-11

## 2022-04-11 LAB
ABO/RH(D): NORMAL
ALBUMIN SERPL-MCNC: 3 G/DL (ref 3.4–5)
ALP SERPL-CCNC: 169 U/L (ref 40–150)
ALT SERPL W P-5'-P-CCNC: 22 U/L (ref 0–50)
ANION GAP SERPL CALCULATED.3IONS-SCNC: 13 MMOL/L (ref 3–14)
ANTIBODY SCREEN: NEGATIVE
APTT PPP: 28 SECONDS (ref 22–38)
AST SERPL W P-5'-P-CCNC: 32 U/L (ref 0–45)
BILIRUB SERPL-MCNC: 0.2 MG/DL (ref 0.2–1.3)
BUN SERPL-MCNC: 12 MG/DL (ref 7–30)
CALCIUM SERPL-MCNC: 9.4 MG/DL (ref 8.5–10.1)
CHLORIDE BLD-SCNC: 111 MMOL/L (ref 94–109)
CO2 SERPL-SCNC: 15 MMOL/L (ref 20–32)
CREAT SERPL-MCNC: 0.78 MG/DL (ref 0.52–1.04)
ERYTHROCYTE [DISTWIDTH] IN BLOOD BY AUTOMATED COUNT: 14 % (ref 10–15)
FIBRINOGEN PPP-MCNC: 484 MG/DL (ref 170–490)
GFR SERPL CREATININE-BSD FRML MDRD: >90 ML/MIN/1.73M2
GLUCOSE BLD-MCNC: 106 MG/DL (ref 70–99)
GLUCOSE BLDC GLUCOMTR-MCNC: 106 MG/DL (ref 70–99)
HCT VFR BLD AUTO: 38.8 % (ref 35–47)
HGB BLD-MCNC: 13.2 G/DL (ref 11.7–15.7)
INR PPP: 0.89 (ref 0.85–1.15)
MCH RBC QN AUTO: 29.3 PG (ref 26.5–33)
MCHC RBC AUTO-ENTMCNC: 34 G/DL (ref 31.5–36.5)
MCV RBC AUTO: 86 FL (ref 78–100)
PLATELET # BLD AUTO: 101 10E3/UL (ref 150–450)
POTASSIUM BLD-SCNC: 3.8 MMOL/L (ref 3.4–5.3)
PROT SERPL-MCNC: 7.6 G/DL (ref 6.8–8.8)
RBC # BLD AUTO: 4.5 10E6/UL (ref 3.8–5.2)
SODIUM SERPL-SCNC: 139 MMOL/L (ref 133–144)
SPECIMEN EXPIRATION DATE: NORMAL
TSH SERPL DL<=0.005 MIU/L-ACNC: 1.98 MU/L (ref 0.4–4)
WBC # BLD AUTO: 6.5 10E3/UL (ref 4–11)

## 2022-04-11 PROCEDURE — 85048 AUTOMATED LEUKOCYTE COUNT: CPT | Performed by: OBSTETRICS & GYNECOLOGY

## 2022-04-11 PROCEDURE — 86747 PARVOVIRUS ANTIBODY: CPT | Performed by: OBSTETRICS & GYNECOLOGY

## 2022-04-11 PROCEDURE — U0005 INFEC AGEN DETEC AMPLI PROBE: HCPCS | Performed by: OBSTETRICS & GYNECOLOGY

## 2022-04-11 PROCEDURE — G0463 HOSPITAL OUTPT CLINIC VISIT: HCPCS

## 2022-04-11 PROCEDURE — 85384 FIBRINOGEN ACTIVITY: CPT | Performed by: OBSTETRICS & GYNECOLOGY

## 2022-04-11 PROCEDURE — 84443 ASSAY THYROID STIM HORMONE: CPT | Performed by: OBSTETRICS & GYNECOLOGY

## 2022-04-11 PROCEDURE — 86780 TREPONEMA PALLIDUM: CPT | Performed by: OBSTETRICS & GYNECOLOGY

## 2022-04-11 PROCEDURE — 86147 CARDIOLIPIN ANTIBODY EA IG: CPT | Performed by: OBSTETRICS & GYNECOLOGY

## 2022-04-11 PROCEDURE — 86901 BLOOD TYPING SEROLOGIC RH(D): CPT | Performed by: OBSTETRICS & GYNECOLOGY

## 2022-04-11 PROCEDURE — 85730 THROMBOPLASTIN TIME PARTIAL: CPT | Performed by: OBSTETRICS & GYNECOLOGY

## 2022-04-11 PROCEDURE — 85610 PROTHROMBIN TIME: CPT | Performed by: OBSTETRICS & GYNECOLOGY

## 2022-04-11 PROCEDURE — 76815 OB US LIMITED FETUS(S): CPT

## 2022-04-11 PROCEDURE — 250N000013 HC RX MED GY IP 250 OP 250 PS 637: Performed by: OBSTETRICS & GYNECOLOGY

## 2022-04-11 PROCEDURE — 80053 COMPREHEN METABOLIC PANEL: CPT | Performed by: OBSTETRICS & GYNECOLOGY

## 2022-04-11 PROCEDURE — 86146 BETA-2 GLYCOPROTEIN ANTIBODY: CPT | Performed by: OBSTETRICS & GYNECOLOGY

## 2022-04-11 PROCEDURE — 120N000001 HC R&B MED SURG/OB

## 2022-04-11 PROCEDURE — 85460 HEMOGLOBIN FETAL: CPT | Performed by: OBSTETRICS & GYNECOLOGY

## 2022-04-11 PROCEDURE — 36415 COLL VENOUS BLD VENIPUNCTURE: CPT | Performed by: OBSTETRICS & GYNECOLOGY

## 2022-04-11 RX ORDER — MISOPROSTOL 100 UG/1
25 TABLET ORAL
Status: COMPLETED | OUTPATIENT
Start: 2022-04-11 | End: 2022-04-12

## 2022-04-11 RX ORDER — MAGNESIUM HYDROXIDE/ALUMINUM HYDROXICE/SIMETHICONE 120; 1200; 1200 MG/30ML; MG/30ML; MG/30ML
30 SUSPENSION ORAL EVERY 4 HOURS PRN
Status: DISCONTINUED | OUTPATIENT
Start: 2022-04-11 | End: 2022-04-13

## 2022-04-11 RX ORDER — MISOPROSTOL 200 UG/1
400 TABLET ORAL
Status: DISCONTINUED | OUTPATIENT
Start: 2022-04-11 | End: 2022-04-13

## 2022-04-11 RX ORDER — DEXTROSE MONOHYDRATE 25 G/50ML
25-50 INJECTION, SOLUTION INTRAVENOUS
Status: DISCONTINUED | OUTPATIENT
Start: 2022-04-11 | End: 2022-04-13

## 2022-04-11 RX ORDER — HYDROXYZINE HYDROCHLORIDE 50 MG/1
100 TABLET, FILM COATED ORAL
Status: DISCONTINUED | OUTPATIENT
Start: 2022-04-11 | End: 2022-04-13

## 2022-04-11 RX ORDER — DIPHENOXYLATE HCL/ATROPINE 2.5-.025MG
2 TABLET ORAL EVERY 6 HOURS PRN
Status: DISCONTINUED | OUTPATIENT
Start: 2022-04-11 | End: 2022-04-13

## 2022-04-11 RX ORDER — PROCHLORPERAZINE 25 MG
25 SUPPOSITORY, RECTAL RECTAL EVERY 12 HOURS PRN
Status: DISCONTINUED | OUTPATIENT
Start: 2022-04-11 | End: 2022-04-13

## 2022-04-11 RX ORDER — NALOXONE HYDROCHLORIDE 0.4 MG/ML
0.4 INJECTION, SOLUTION INTRAMUSCULAR; INTRAVENOUS; SUBCUTANEOUS
Status: DISCONTINUED | OUTPATIENT
Start: 2022-04-11 | End: 2022-04-13

## 2022-04-11 RX ORDER — IBUPROFEN 800 MG/1
800 TABLET, FILM COATED ORAL
Status: DISCONTINUED | OUTPATIENT
Start: 2022-04-11 | End: 2022-04-13

## 2022-04-11 RX ORDER — LEVOTHYROXINE SODIUM 88 UG/1
88 TABLET ORAL
Status: DISCONTINUED | OUTPATIENT
Start: 2022-04-12 | End: 2022-04-14 | Stop reason: HOSPADM

## 2022-04-11 RX ORDER — OXYTOCIN/0.9 % SODIUM CHLORIDE 30/500 ML
100-340 PLASTIC BAG, INJECTION (ML) INTRAVENOUS CONTINUOUS PRN
Status: DISCONTINUED | OUTPATIENT
Start: 2022-04-11 | End: 2022-04-13

## 2022-04-11 RX ORDER — SODIUM CHLORIDE, SODIUM LACTATE, POTASSIUM CHLORIDE, CALCIUM CHLORIDE 600; 310; 30; 20 MG/100ML; MG/100ML; MG/100ML; MG/100ML
INJECTION, SOLUTION INTRAVENOUS CONTINUOUS
Status: DISCONTINUED | OUTPATIENT
Start: 2022-04-11 | End: 2022-04-13

## 2022-04-11 RX ORDER — ONDANSETRON 2 MG/ML
4 INJECTION INTRAMUSCULAR; INTRAVENOUS EVERY 6 HOURS PRN
Status: DISCONTINUED | OUTPATIENT
Start: 2022-04-11 | End: 2022-04-13

## 2022-04-11 RX ORDER — OXYTOCIN/0.9 % SODIUM CHLORIDE 30/500 ML
340 PLASTIC BAG, INJECTION (ML) INTRAVENOUS CONTINUOUS PRN
Status: DISCONTINUED | OUTPATIENT
Start: 2022-04-11 | End: 2022-04-13

## 2022-04-11 RX ORDER — ONDANSETRON 4 MG/1
4 TABLET, ORALLY DISINTEGRATING ORAL EVERY 6 HOURS PRN
Status: DISCONTINUED | OUTPATIENT
Start: 2022-04-11 | End: 2022-04-13

## 2022-04-11 RX ORDER — MISOPROSTOL 200 UG/1
800 TABLET ORAL
Status: DISCONTINUED | OUTPATIENT
Start: 2022-04-11 | End: 2022-04-13

## 2022-04-11 RX ORDER — CITRIC ACID/SODIUM CITRATE 334-500MG
30 SOLUTION, ORAL ORAL
Status: DISCONTINUED | OUTPATIENT
Start: 2022-04-11 | End: 2022-04-13

## 2022-04-11 RX ORDER — TRANEXAMIC ACID 10 MG/ML
1 INJECTION, SOLUTION INTRAVENOUS EVERY 30 MIN PRN
Status: DISCONTINUED | OUTPATIENT
Start: 2022-04-11 | End: 2022-04-13

## 2022-04-11 RX ORDER — METOCLOPRAMIDE 10 MG/1
10 TABLET ORAL EVERY 6 HOURS PRN
Status: DISCONTINUED | OUTPATIENT
Start: 2022-04-11 | End: 2022-04-13

## 2022-04-11 RX ORDER — LIDOCAINE 40 MG/G
CREAM TOPICAL
Status: DISCONTINUED | OUTPATIENT
Start: 2022-04-11 | End: 2022-04-13

## 2022-04-11 RX ORDER — ACETAMINOPHEN 325 MG/1
650 TABLET ORAL EVERY 4 HOURS PRN
Status: DISCONTINUED | OUTPATIENT
Start: 2022-04-11 | End: 2022-04-13

## 2022-04-11 RX ORDER — NALOXONE HYDROCHLORIDE 0.4 MG/ML
0.2 INJECTION, SOLUTION INTRAMUSCULAR; INTRAVENOUS; SUBCUTANEOUS
Status: DISCONTINUED | OUTPATIENT
Start: 2022-04-11 | End: 2022-04-13

## 2022-04-11 RX ORDER — OXYTOCIN 10 [USP'U]/ML
10 INJECTION, SOLUTION INTRAMUSCULAR; INTRAVENOUS
Status: DISCONTINUED | OUTPATIENT
Start: 2022-04-11 | End: 2022-04-13

## 2022-04-11 RX ORDER — KETOROLAC TROMETHAMINE 30 MG/ML
30 INJECTION, SOLUTION INTRAMUSCULAR; INTRAVENOUS
Status: DISCONTINUED | OUTPATIENT
Start: 2022-04-11 | End: 2022-04-13

## 2022-04-11 RX ORDER — CARBOPROST TROMETHAMINE 250 UG/ML
250 INJECTION, SOLUTION INTRAMUSCULAR
Status: DISCONTINUED | OUTPATIENT
Start: 2022-04-11 | End: 2022-04-13

## 2022-04-11 RX ORDER — NICOTINE POLACRILEX 4 MG
15-30 LOZENGE BUCCAL
Status: DISCONTINUED | OUTPATIENT
Start: 2022-04-11 | End: 2022-04-13

## 2022-04-11 RX ORDER — METHYLERGONOVINE MALEATE 0.2 MG/ML
200 INJECTION INTRAVENOUS
Status: DISCONTINUED | OUTPATIENT
Start: 2022-04-11 | End: 2022-04-13

## 2022-04-11 RX ORDER — FENTANYL CITRATE 50 UG/ML
100 INJECTION, SOLUTION INTRAMUSCULAR; INTRAVENOUS
Status: DISCONTINUED | OUTPATIENT
Start: 2022-04-11 | End: 2022-04-13

## 2022-04-11 RX ORDER — LEVOTHYROXINE SODIUM 88 UG/1
88 TABLET ORAL WEEKLY
Status: DISCONTINUED | OUTPATIENT
Start: 2022-04-13 | End: 2022-04-14 | Stop reason: HOSPADM

## 2022-04-11 RX ORDER — METOCLOPRAMIDE HYDROCHLORIDE 5 MG/ML
10 INJECTION INTRAMUSCULAR; INTRAVENOUS EVERY 6 HOURS PRN
Status: DISCONTINUED | OUTPATIENT
Start: 2022-04-11 | End: 2022-04-13

## 2022-04-11 RX ORDER — DIPHENOXYLATE HCL/ATROPINE 2.5-.025MG
2 TABLET ORAL ONCE
Status: DISCONTINUED | OUTPATIENT
Start: 2022-04-11 | End: 2022-04-13

## 2022-04-11 RX ORDER — PROCHLORPERAZINE MALEATE 10 MG
10 TABLET ORAL EVERY 6 HOURS PRN
Status: DISCONTINUED | OUTPATIENT
Start: 2022-04-11 | End: 2022-04-13

## 2022-04-11 RX ADMIN — MISOPROSTOL 25 MCG: 100 TABLET ORAL at 22:54

## 2022-04-11 RX ADMIN — MISOPROSTOL 25 MCG: 100 TABLET ORAL at 20:51

## 2022-04-11 ASSESSMENT — ACTIVITIES OF DAILY LIVING (ADL)
ADLS_ACUITY_SCORE: 4
ADLS_ACUITY_SCORE: 4
ADLS_ACUITY_SCORE: 10
ADLS_ACUITY_SCORE: 4

## 2022-04-11 NOTE — PLAN OF CARE
Radiology at bedside to perform ultrasound.  No fetal heart rate detected. Dr. Hylton at bedside to inform patient of fetal demise.  Patient and spouse given time to process this information. Patient asking questions as to what caused this, if she should have come sooner, if she should have done something differently, etc.  Patient reassured that this was not her fault and that her course of actions would not have changed the outcome.  Will discuss plan of care further with Dr. Hylton shortly.

## 2022-04-11 NOTE — PROGRESS NOTES
OB triage note    S:  Pt presented with decreased fetal movement since this morning. Pt states the movement was normal yesterday and this morning. The felt some contractions that were mild. Pt has not had any trauma or pain. No vaginal bleeding or leakage of fluid.     Her pregnancy was complicated by diet controlled gestational diabetes.     O:  There were no vitals taken for this visit.  Gen-A&O, NAD  Abd- Gravid, non-tender  EFM- No fetal heart tones heard or seen. Oblique lie with the head in the right lower quadrant and butt in the left upper quadrant.      A/P: 32 year old  40 5/7 week with a fetal demise confirmed by bedside US and by US tech. The findings were discussed with the pt and her . Possible causes for fetal demise discussed briefly. Pt reassured that there was not anything she could have done to prevent this outcome. Options for delivery were discussed. Pt and  advised that they can go home and come back tomorrow or the next day for induction or stay for induction tonight. Pt will needs fetal demise work up.    Ligia Hylton MD  2022

## 2022-04-11 NOTE — PROVIDER NOTIFICATION
04/11/22 2857   Provider Notification   Provider Name/Title Dr. Hylton   Method of Notification At Bedside   Request Evaluate in Person   This writer and MD called to bedside to discuss patient preferences for moving forwards. Patient and spouse would like to proceed with induction at this time. Plan discussed with patient and spouse for likely need for cervical ripening given last SVE prior to induction of labor. Discussed additional lab work given patient circumstance and options for autopsy, genetic testing, etc briefly discussed.  Informed patient of options for pain management. Patient and spouse questions answered and would like to proceed with cervical ripening at this time. SVE per MD unchanged since previous exam. MD to enter orders, cares handed off to Lennie Quiles RN.

## 2022-04-11 NOTE — PLAN OF CARE
Data: Patient presented to [facility]: 2022  2:53 PM.  Reason for maternal/fetal assessment is decreased fetal movement. Patient reports she has not felt any fetal movements for the last 5 hours.  Patient states she has eaten and tried resting but has continued to feel no movements. Patient states she was having intermittent contractions earlier this morning that subsided and after that she has not felt any movements. Patient is a .  Prenatal record reviewed. Pregnancy has been uncomplicated..  Gestational Age Unknown. VSS. Fetal movement decreased since 1000. Patient denies leaking of vaginal fluid/rupture of membranes, vaginal bleeding, abdominal pain, pelvic pressure, nausea, vomiting, headache, visual disturbances, epigastric or URQ pain, significant edema. Support person is present.   Action: Verbal consent for EFM. Triage assessment completed. Bill of rights reviewed.  Response: Patient verbalized agreement with plan. Will contact Dr Ligia Hylton with update and for further orders.

## 2022-04-11 NOTE — PROVIDER NOTIFICATION
04/11/22 1600   Provider Notification   Provider Name/Title Dr. Hylton   Method of Notification At Bedside   Request Evaluate in Person   Dr. Hylton at bedside to discuss plan of care. Patient presented with options of going home and coming back for induction of labor later this evening or tomorrow morning after having time to process news of fetal demise, to proceed with induction of labor at this time.  Patient and spouse asking appropriate questions for the circumstance. Patient and significant other wishing to discuss options and will notify RN and MD when decision has been made.

## 2022-04-11 NOTE — PROVIDER NOTIFICATION
04/11/22 1526   Provider Notification   Provider Name/Title Dr. Hylton   Method of Notification At Bedside   Request Evaluate in Person   Dr. Hylton at bedside to perform ultrasound. MD unable to find fetal heart rate.  Order placed for STAT bedside ultrasound.  Patient and significant other asking appropriate questions at this time. MD planning to discuss findings after offical ultrasound.

## 2022-04-11 NOTE — PROVIDER NOTIFICATION
04/11/22 1517   Provider Notification   Provider Name/Title Dr. Hylton   Method of Notification Phone   Request Evaluate in Person   Notification Reason Patient Arrived;Other (Comment)   Dr. Hylton paged and updated on patient arrival. Patient report of absent fetal movemetns for the last 4-5 hours.  This writer unable to get fetal heart tones and requests MD come to do bedside ultrasound.  MD coming to unit now.

## 2022-04-12 ENCOUNTER — ANESTHESIA (OUTPATIENT)
Dept: OBGYN | Facility: CLINIC | Age: 32
End: 2022-04-12
Payer: COMMERCIAL

## 2022-04-12 ENCOUNTER — ANESTHESIA EVENT (OUTPATIENT)
Dept: OBGYN | Facility: CLINIC | Age: 32
End: 2022-04-12
Payer: COMMERCIAL

## 2022-04-12 LAB
AMPHETAMINES UR QL SCN: NORMAL
CANNABINOIDS UR QL SCN: NORMAL
COCAINE UR QL: NORMAL
DRVVT SCREEN RATIO: 1.03
ERYTHROCYTE [DISTWIDTH] IN BLOOD BY AUTOMATED COUNT: 14 % (ref 10–15)
FETAL RBC % LFV: 0.1 %
FETAL RBC (ML): 3 ML
GLUCOSE BLDC GLUCOMTR-MCNC: 112 MG/DL (ref 70–99)
GLUCOSE BLDC GLUCOMTR-MCNC: 123 MG/DL (ref 70–99)
GLUCOSE BLDC GLUCOMTR-MCNC: 89 MG/DL (ref 70–99)
HCT VFR BLD AUTO: 38.5 % (ref 35–47)
HGB BLD-MCNC: 13.5 G/DL (ref 11.7–15.7)
IF INDICATED RECOMMENDED DOSE OF RH IMMUNE GLOBULIN UG: 300 UG
LA PPP-IMP: NEGATIVE
LUPUS INTERPRETATION: NORMAL
MCH RBC QN AUTO: 29.7 PG (ref 26.5–33)
MCHC RBC AUTO-ENTMCNC: 35.1 G/DL (ref 31.5–36.5)
MCV RBC AUTO: 85 FL (ref 78–100)
OPIATES UR QL SCN: NORMAL
PCP UR QL SCN: NORMAL
PLATELET # BLD AUTO: 86 10E3/UL (ref 150–450)
PLATELET # BLD AUTO: 87 10E3/UL (ref 150–450)
PLATELET # BLD AUTO: 87 10E3/UL (ref 150–450)
PTT RATIO: 0.95
RBC # BLD AUTO: 4.54 10E6/UL (ref 3.8–5.2)
SARS-COV-2 RNA RESP QL NAA+PROBE: NEGATIVE
T PALLIDUM AB SER QL: NONREACTIVE
THROMBIN TIME: 16.4 SECONDS (ref 13–19)
WBC # BLD AUTO: 7.4 10E3/UL (ref 4–11)

## 2022-04-12 PROCEDURE — 36415 COLL VENOUS BLD VENIPUNCTURE: CPT | Performed by: OBSTETRICS & GYNECOLOGY

## 2022-04-12 PROCEDURE — 85018 HEMOGLOBIN: CPT | Performed by: OBSTETRICS & GYNECOLOGY

## 2022-04-12 PROCEDURE — 370N000003 HC ANESTHESIA WARD SERVICE

## 2022-04-12 PROCEDURE — 85049 AUTOMATED PLATELET COUNT: CPT | Performed by: ANESTHESIOLOGY

## 2022-04-12 PROCEDURE — 250N000011 HC RX IP 250 OP 636: Performed by: OBSTETRICS & GYNECOLOGY

## 2022-04-12 PROCEDURE — 36415 COLL VENOUS BLD VENIPUNCTURE: CPT | Performed by: ANESTHESIOLOGY

## 2022-04-12 PROCEDURE — 85048 AUTOMATED LEUKOCYTE COUNT: CPT | Performed by: OBSTETRICS & GYNECOLOGY

## 2022-04-12 PROCEDURE — 120N000001 HC R&B MED SURG/OB

## 2022-04-12 PROCEDURE — 85390 FIBRINOLYSINS SCREEN I&R: CPT | Mod: 26 | Performed by: PATHOLOGY

## 2022-04-12 PROCEDURE — 250N000013 HC RX MED GY IP 250 OP 250 PS 637: Performed by: OBSTETRICS & GYNECOLOGY

## 2022-04-12 PROCEDURE — 80307 DRUG TEST PRSMV CHEM ANLYZR: CPT | Performed by: OBSTETRICS & GYNECOLOGY

## 2022-04-12 RX ORDER — OXYTOCIN/0.9 % SODIUM CHLORIDE 30/500 ML
1-24 PLASTIC BAG, INJECTION (ML) INTRAVENOUS CONTINUOUS
Status: DISCONTINUED | OUTPATIENT
Start: 2022-04-12 | End: 2022-04-13

## 2022-04-12 RX ORDER — ONDANSETRON 2 MG/ML
4 INJECTION INTRAMUSCULAR; INTRAVENOUS EVERY 6 HOURS PRN
Status: DISCONTINUED | OUTPATIENT
Start: 2022-04-12 | End: 2022-04-12

## 2022-04-12 RX ORDER — MORPHINE SULFATE 10 MG/ML
10 INJECTION, SOLUTION INTRAMUSCULAR; INTRAVENOUS ONCE
Status: COMPLETED | OUTPATIENT
Start: 2022-04-12 | End: 2022-04-12

## 2022-04-12 RX ORDER — SCOLOPAMINE TRANSDERMAL SYSTEM 1 MG/1
1 PATCH, EXTENDED RELEASE TRANSDERMAL
Status: DISCONTINUED | OUTPATIENT
Start: 2022-04-12 | End: 2022-04-13

## 2022-04-12 RX ORDER — NALBUPHINE HYDROCHLORIDE 10 MG/ML
2.5-5 INJECTION, SOLUTION INTRAMUSCULAR; INTRAVENOUS; SUBCUTANEOUS EVERY 6 HOURS PRN
Status: DISCONTINUED | OUTPATIENT
Start: 2022-04-12 | End: 2022-04-13

## 2022-04-12 RX ORDER — SODIUM CHLORIDE, SODIUM LACTATE, POTASSIUM CHLORIDE, CALCIUM CHLORIDE 600; 310; 30; 20 MG/100ML; MG/100ML; MG/100ML; MG/100ML
INJECTION, SOLUTION INTRAVENOUS CONTINUOUS PRN
Status: DISCONTINUED | OUTPATIENT
Start: 2022-04-12 | End: 2022-04-13

## 2022-04-12 RX ORDER — BUPIVACAINE HYDROCHLORIDE 2.5 MG/ML
10 INJECTION, SOLUTION EPIDURAL; INFILTRATION; INTRACAUDAL ONCE
Status: DISCONTINUED | OUTPATIENT
Start: 2022-04-12 | End: 2022-04-13

## 2022-04-12 RX ORDER — ONDANSETRON 4 MG/1
4 TABLET, ORALLY DISINTEGRATING ORAL EVERY 6 HOURS PRN
Status: DISCONTINUED | OUTPATIENT
Start: 2022-04-12 | End: 2022-04-12

## 2022-04-12 RX ORDER — FENTANYL CITRATE-0.9 % NACL/PF 10 MCG/ML
100 PLASTIC BAG, INJECTION (ML) INTRAVENOUS EVERY 5 MIN PRN
Status: DISCONTINUED | OUTPATIENT
Start: 2022-04-12 | End: 2022-04-13

## 2022-04-12 RX ORDER — MORPHINE SULFATE 10 MG/ML
5 INJECTION, SOLUTION INTRAMUSCULAR; INTRAVENOUS
Status: COMPLETED | OUTPATIENT
Start: 2022-04-12 | End: 2022-04-13

## 2022-04-12 RX ADMIN — MISOPROSTOL 25 MCG: 100 TABLET ORAL at 13:11

## 2022-04-12 RX ADMIN — MISOPROSTOL 25 MCG: 100 TABLET ORAL at 19:30

## 2022-04-12 RX ADMIN — MISOPROSTOL 25 MCG: 100 TABLET ORAL at 06:15

## 2022-04-12 RX ADMIN — HYDROXYZINE HYDROCHLORIDE 100 MG: 50 TABLET, FILM COATED ORAL at 15:34

## 2022-04-12 RX ADMIN — MISOPROSTOL 25 MCG: 100 TABLET ORAL at 04:03

## 2022-04-12 RX ADMIN — MISOPROSTOL 25 MCG: 100 TABLET ORAL at 17:17

## 2022-04-12 RX ADMIN — MISOPROSTOL 25 MCG: 100 TABLET ORAL at 00:58

## 2022-04-12 RX ADMIN — LEVOTHYROXINE SODIUM 88 MCG: 0.09 TABLET ORAL at 08:06

## 2022-04-12 RX ADMIN — MISOPROSTOL 25 MCG: 100 TABLET ORAL at 21:35

## 2022-04-12 RX ADMIN — MISOPROSTOL 25 MCG: 100 TABLET ORAL at 15:26

## 2022-04-12 RX ADMIN — MISOPROSTOL 25 MCG: 100 TABLET ORAL at 09:12

## 2022-04-12 RX ADMIN — MORPHINE SULFATE 10 MG: 10 INJECTION INTRAVENOUS at 15:27

## 2022-04-12 RX ADMIN — MISOPROSTOL 25 MCG: 100 TABLET ORAL at 11:21

## 2022-04-12 ASSESSMENT — ACTIVITIES OF DAILY LIVING (ADL)
ADLS_ACUITY_SCORE: 4

## 2022-04-12 NOTE — PROVIDER NOTIFICATION
04/12/22 1653   Provider Notification   Provider Name/Title ROCAEL   Method of Notification At Bedside   Request Evaluate in Person   Following SVE MD discussed POC & recommended we continue to complete the oral cytotec.  The final dose is due at 2115.  MD suggested SVE at 8194 & update the on call MD.  Dr Otto will assume cares now.

## 2022-04-12 NOTE — PROGRESS NOTES
[facility] Progress Note  [facility] Labor and Delivery    Referral Source: [facility] consultation order requesting pt/family support for fetal demise.  Pt and spouse are [employer].  RN has oriented to [facility] and family has declined [facility] at this time.     Plan: [facility] remains available for additional emotional/spiritual support.    Alexis El MA  Staff   Pager: [phone]  Phone: [phone]  *off on Mondays

## 2022-04-12 NOTE — PROGRESS NOTES
OB Progress Note  2022  10:48 AM    S:  Pt appropriately quiet and sad.  Patient and  asking many appropriate questions.  Patient feeling lower back pain and ctx - ctx were more intense around 4 am, still present but less intense this AM.  No VB or LOF.  6th dose of oral cytotec given at 912 am.      O:  /86   Pulse 106   Temp 98.8  F (37.1  C) (Oral)   Resp 15   Ht 1.524 m (5')   Wt 59 kg (130 lb)   SpO2 98%   BMI 25.39 kg/m    [place]:  Ctx q2-4 min  SVE:  Not examined this AM  Membranes: intact    Utox neg  Trep antibody neg  WBC 7.4  Hgb13.5  Plts 87K  KB fetal RBC % 0.1, fetal RBCs 3  covid neg  Cr 0.78  AST 32  ALT 22  TSH 1.98  Fibrinogen 484  aPTT 28  INR  0.89      A/P:  32 year old  @40w6d admitted yesterday with decreased FM, found to have IUFD.   1.  Discussed IUFD with patient and  and questions answered.  Discussed evaluation to attempt to find cause.  Discussed blood test drawn yesterday.  Discussed option for autopsy - patient and  to discuss.  Need to also offer karotype.  Plan to send placenta to pathology.  2. IOL  - Discussed recommendation to continue with cervical ripening for IOL.  Discussed options for cervical ripening and recommend continuing with oral cytotec at this time.  Will reassess this evening.  Discussed ptiocin when cervix favorable.  Discussed reasons why IOL is preferred vs.  for delivery.  3. Pain control - discussed epidural when in labor.  For now, offered morphine IM and vistaril, fentanyl IV, PCA.  Patient plans to eat breakfast and then may want morphine and vistaril.  4. Gestational HTN - BPs in mild range since admission.  No elevated BPs prior to admission.  Labs normal except low platelets (known gestational thrombocytopenia).  Will continue to closely monitor.    Alondra Denney MD

## 2022-04-12 NOTE — PROVIDER NOTIFICATION
04/11/22 1839   Provider Notification   Provider Name/Title Dr Meade   Method of Notification Electronic Page   Request Evaluate - Remote   Notification Reason Maternal Vital Sign Change     Spoke to MD on the phone, discussion included but not limited to: Informed MD of patients elevated BPs and platelets of 101. Orders received for cervical ripening, diabetes management, synthroid (home medication), additional orders for lab to rule out pre-eclampsia. MD states OK to check blood pressures every hour while elevated and then routine [id number] when in normal range and if severe, check per protocol. MD encouraging patient to eat some food then start th cervical ripening process around 2000.

## 2022-04-12 NOTE — PLAN OF CARE
0300 dose of cytotec delayed due to contractions lasting >2 minutes. Neetu emptied bladder and has been orally hydrating well. Contractions have become more regular in pattern, palpating moderate. SVE unchanged. Plan of care discussed with Neetu. Around 0400, contractions noticed to be spacing and lasting <2 minutes. Cytotec administered.     Neetu is uncomfortable with contractions. Pain management discussed including atarax, nitrous oxide, fentanyl administration. Neetu declining medication at this time. Declines heat pad. Pt informed she is able to change position as needed, currently elects to try to rest in bped.

## 2022-04-12 NOTE — PROVIDER NOTIFICATION
04/11/22 2200   Provider Notification   Provider Name/Title Dr. Meade   Method of Notification Phone   MD notified of recent blood pressures, pulse rates. Cervical ripening started at 2051. Discussed most recent platelet value, TORB for platelet recheck at 0600. If patient is to become uncomfortable during ripening process, utilize vistaril and fentanyl.

## 2022-04-12 NOTE — PLAN OF CARE
"Neetu has had irregular contractions throughout the night. Contractions palpating mild to moderate. These contractions have been uncomfortable for her but \"not unbearable\". She has declined pain management and sleep aid overnight.     Neetu and her  have appropriate questions at this time. Neetu appears tired and has expressed before cytotec administration, \"how long will this take?\" and \"how much longer will I be taking this medicine\". Education provided; discussed that plan of care may change with oncoming physician, changes in cervix, or changes in labor pattern.           "

## 2022-04-13 LAB
B2 GLYCOPROT1 IGG SERPL IA-ACNC: <0.8 U/ML
B2 GLYCOPROT1 IGM SERPL IA-ACNC: <2.4 U/ML
CARDIOLIPIN IGG SER IA-ACNC: <2 GPL-U/ML
CARDIOLIPIN IGG SER IA-ACNC: NEGATIVE
CARDIOLIPIN IGM SER IA-ACNC: <2 MPL-U/ML
CARDIOLIPIN IGM SER IA-ACNC: NEGATIVE
CREAT UR-MCNC: 28 MG/DL
GLUCOSE BLDC GLUCOMTR-MCNC: 106 MG/DL (ref 70–99)
INR PPP: 0.95 (ref 0.85–1.15)
PLATELET # BLD AUTO: 67 10E3/UL (ref 150–450)
PLATELET # BLD AUTO: 74 10E3/UL (ref 150–450)
PROT UR-MCNC: 0.27 G/L
PROT/CREAT 24H UR: 0.96 G/G CR (ref 0–0.2)

## 2022-04-13 PROCEDURE — 00HU33Z INSERTION OF INFUSION DEVICE INTO SPINAL CANAL, PERCUTANEOUS APPROACH: ICD-10-PCS | Performed by: ANESTHESIOLOGY

## 2022-04-13 PROCEDURE — 88307 TISSUE EXAM BY PATHOLOGIST: CPT | Mod: TC | Performed by: OBSTETRICS & GYNECOLOGY

## 2022-04-13 PROCEDURE — 250N000013 HC RX MED GY IP 250 OP 250 PS 637: Performed by: OBSTETRICS & GYNECOLOGY

## 2022-04-13 PROCEDURE — 258N000003 HC RX IP 258 OP 636: Performed by: ANESTHESIOLOGY

## 2022-04-13 PROCEDURE — 0KQM0ZZ REPAIR PERINEUM MUSCLE, OPEN APPROACH: ICD-10-PCS | Performed by: OBSTETRICS & GYNECOLOGY

## 2022-04-13 PROCEDURE — 88262 CHROMOSOME ANALYSIS 15-20: CPT | Performed by: OBSTETRICS & GYNECOLOGY

## 2022-04-13 PROCEDURE — 250N000009 HC RX 250: Performed by: OBSTETRICS & GYNECOLOGY

## 2022-04-13 PROCEDURE — 84156 ASSAY OF PROTEIN URINE: CPT | Performed by: OBSTETRICS & GYNECOLOGY

## 2022-04-13 PROCEDURE — 36415 COLL VENOUS BLD VENIPUNCTURE: CPT | Performed by: ANESTHESIOLOGY

## 2022-04-13 PROCEDURE — 120N000001 HC R&B MED SURG/OB

## 2022-04-13 PROCEDURE — 3E0R3BZ INTRODUCTION OF ANESTHETIC AGENT INTO SPINAL CANAL, PERCUTANEOUS APPROACH: ICD-10-PCS | Performed by: ANESTHESIOLOGY

## 2022-04-13 PROCEDURE — 722N000001 HC LABOR CARE VAGINAL DELIVERY SINGLE

## 2022-04-13 PROCEDURE — 250N000011 HC RX IP 250 OP 636: Performed by: ANESTHESIOLOGY

## 2022-04-13 PROCEDURE — 85049 AUTOMATED PLATELET COUNT: CPT | Performed by: ANESTHESIOLOGY

## 2022-04-13 PROCEDURE — 85049 AUTOMATED PLATELET COUNT: CPT | Performed by: OBSTETRICS & GYNECOLOGY

## 2022-04-13 PROCEDURE — 36415 COLL VENOUS BLD VENIPUNCTURE: CPT | Performed by: OBSTETRICS & GYNECOLOGY

## 2022-04-13 PROCEDURE — 250N000011 HC RX IP 250 OP 636: Performed by: OBSTETRICS & GYNECOLOGY

## 2022-04-13 PROCEDURE — 258N000003 HC RX IP 258 OP 636: Performed by: OBSTETRICS & GYNECOLOGY

## 2022-04-13 PROCEDURE — 3E033VJ INTRODUCTION OF OTHER HORMONE INTO PERIPHERAL VEIN, PERCUTANEOUS APPROACH: ICD-10-PCS | Performed by: OBSTETRICS & GYNECOLOGY

## 2022-04-13 PROCEDURE — 85610 PROTHROMBIN TIME: CPT | Performed by: ANESTHESIOLOGY

## 2022-04-13 RX ORDER — ACETAMINOPHEN 325 MG/1
650 TABLET ORAL EVERY 4 HOURS PRN
Status: DISCONTINUED | OUTPATIENT
Start: 2022-04-13 | End: 2022-04-14 | Stop reason: HOSPADM

## 2022-04-13 RX ORDER — BUPIVACAINE HYDROCHLORIDE 2.5 MG/ML
INJECTION, SOLUTION EPIDURAL; INFILTRATION; INTRACAUDAL
Status: COMPLETED | OUTPATIENT
Start: 2022-04-13 | End: 2022-04-13

## 2022-04-13 RX ORDER — DEXTROSE MONOHYDRATE 25 G/50ML
25-50 INJECTION, SOLUTION INTRAVENOUS
Status: DISCONTINUED | OUTPATIENT
Start: 2022-04-13 | End: 2022-04-14 | Stop reason: HOSPADM

## 2022-04-13 RX ORDER — TRANEXAMIC ACID 10 MG/ML
1 INJECTION, SOLUTION INTRAVENOUS EVERY 30 MIN PRN
Status: DISCONTINUED | OUTPATIENT
Start: 2022-04-13 | End: 2022-04-14 | Stop reason: HOSPADM

## 2022-04-13 RX ORDER — IBUPROFEN 800 MG/1
800 TABLET, FILM COATED ORAL EVERY 6 HOURS PRN
Status: DISCONTINUED | OUTPATIENT
Start: 2022-04-13 | End: 2022-04-14 | Stop reason: HOSPADM

## 2022-04-13 RX ORDER — BISACODYL 10 MG
10 SUPPOSITORY, RECTAL RECTAL DAILY PRN
Status: DISCONTINUED | OUTPATIENT
Start: 2022-04-13 | End: 2022-04-14 | Stop reason: HOSPADM

## 2022-04-13 RX ORDER — CARBOPROST TROMETHAMINE 250 UG/ML
250 INJECTION, SOLUTION INTRAMUSCULAR
Status: DISCONTINUED | OUTPATIENT
Start: 2022-04-13 | End: 2022-04-14 | Stop reason: HOSPADM

## 2022-04-13 RX ORDER — LEVOTHYROXINE SODIUM 88 UG/1
88 TABLET ORAL DAILY
Status: DISCONTINUED | OUTPATIENT
Start: 2022-04-13 | End: 2022-04-13

## 2022-04-13 RX ORDER — METHYLERGONOVINE MALEATE 0.2 MG/ML
200 INJECTION INTRAVENOUS
Status: DISCONTINUED | OUTPATIENT
Start: 2022-04-13 | End: 2022-04-14 | Stop reason: HOSPADM

## 2022-04-13 RX ORDER — OXYTOCIN 10 [USP'U]/ML
10 INJECTION, SOLUTION INTRAMUSCULAR; INTRAVENOUS
Status: DISCONTINUED | OUTPATIENT
Start: 2022-04-13 | End: 2022-04-14 | Stop reason: HOSPADM

## 2022-04-13 RX ORDER — DOCUSATE SODIUM 100 MG/1
100 CAPSULE, LIQUID FILLED ORAL DAILY
Status: DISCONTINUED | OUTPATIENT
Start: 2022-04-13 | End: 2022-04-14 | Stop reason: HOSPADM

## 2022-04-13 RX ORDER — NICOTINE POLACRILEX 4 MG
15-30 LOZENGE BUCCAL
Status: DISCONTINUED | OUTPATIENT
Start: 2022-04-13 | End: 2022-04-14 | Stop reason: HOSPADM

## 2022-04-13 RX ORDER — MISOPROSTOL 200 UG/1
800 TABLET ORAL
Status: DISCONTINUED | OUTPATIENT
Start: 2022-04-13 | End: 2022-04-14 | Stop reason: HOSPADM

## 2022-04-13 RX ORDER — OXYTOCIN/0.9 % SODIUM CHLORIDE 30/500 ML
340 PLASTIC BAG, INJECTION (ML) INTRAVENOUS CONTINUOUS PRN
Status: DISCONTINUED | OUTPATIENT
Start: 2022-04-13 | End: 2022-04-14 | Stop reason: HOSPADM

## 2022-04-13 RX ORDER — HYDROCORTISONE 2.5 %
CREAM (GRAM) TOPICAL 3 TIMES DAILY PRN
Status: DISCONTINUED | OUTPATIENT
Start: 2022-04-13 | End: 2022-04-14 | Stop reason: HOSPADM

## 2022-04-13 RX ORDER — MISOPROSTOL 200 UG/1
400 TABLET ORAL
Status: DISCONTINUED | OUTPATIENT
Start: 2022-04-13 | End: 2022-04-14 | Stop reason: HOSPADM

## 2022-04-13 RX ADMIN — BUPIVACAINE HYDROCHLORIDE 10 ML: 2.5 INJECTION, SOLUTION EPIDURAL; INFILTRATION; INTRACAUDAL at 01:44

## 2022-04-13 RX ADMIN — SODIUM CHLORIDE, POTASSIUM CHLORIDE, SODIUM LACTATE AND CALCIUM CHLORIDE: 600; 310; 30; 20 INJECTION, SOLUTION INTRAVENOUS at 01:32

## 2022-04-13 RX ADMIN — Medication 1 MILLI-UNITS/MIN: at 00:18

## 2022-04-13 RX ADMIN — FENTANYL CITRATE: 0.05 INJECTION, SOLUTION INTRAMUSCULAR; INTRAVENOUS at 09:26

## 2022-04-13 RX ADMIN — FENTANYL CITRATE: 0.05 INJECTION, SOLUTION INTRAMUSCULAR; INTRAVENOUS at 01:50

## 2022-04-13 RX ADMIN — SODIUM CHLORIDE, POTASSIUM CHLORIDE, SODIUM LACTATE AND CALCIUM CHLORIDE: 600; 310; 30; 20 INJECTION, SOLUTION INTRAVENOUS at 09:10

## 2022-04-13 RX ADMIN — TRANEXAMIC ACID 1 G: 10 INJECTION, SOLUTION INTRAVENOUS at 12:20

## 2022-04-13 RX ADMIN — DOCUSATE SODIUM 100 MG: 100 CAPSULE, LIQUID FILLED ORAL at 19:47

## 2022-04-13 RX ADMIN — ACETAMINOPHEN 650 MG: 325 TABLET, FILM COATED ORAL at 17:05

## 2022-04-13 RX ADMIN — SODIUM CHLORIDE, POTASSIUM CHLORIDE, SODIUM LACTATE AND CALCIUM CHLORIDE: 600; 310; 30; 20 INJECTION, SOLUTION INTRAVENOUS at 00:16

## 2022-04-13 RX ADMIN — ACETAMINOPHEN 650 MG: 325 TABLET, FILM COATED ORAL at 11:11

## 2022-04-13 RX ADMIN — LEVOTHYROXINE SODIUM 88 MCG: 0.09 TABLET ORAL at 08:05

## 2022-04-13 RX ADMIN — IBUPROFEN 800 MG: 800 TABLET, FILM COATED ORAL at 13:49

## 2022-04-13 RX ADMIN — MORPHINE SULFATE 5 MG: 10 INJECTION INTRAVENOUS at 00:10

## 2022-04-13 ASSESSMENT — ACTIVITIES OF DAILY LIVING (ADL)
ADLS_ACUITY_SCORE: 4

## 2022-04-13 NOTE — ANESTHESIA PROCEDURE NOTES
Epidural catheter Procedure Note    Pre-Procedure   Staff -        Anesthesiologist:  Dawson Bundy DO       Performed By: anesthesiologist       Referred By: Mar Otto MD       Location: OB       Pre-Anesthestic Checklist: patient identified, IV checked, risks and benefits discussed, informed consent, monitors and equipment checked, pre-op evaluation, at physician/surgeon's request and post-op pain management  Timeout:       Correct Patient: Yes        Correct Procedure: Yes        Correct Site: Yes        Correct Position: Yes   Procedure Documentation  Procedure: epidural catheter       Patient Position: sitting       Patient Prep/Sterile Barriers: sterile gloves, mask, patient draped       Skin prep: Betadine       Local skin infiltrated with mL of 1% lidocaine.        Insertion Site: L2-3. (midline approach).       Technique: LORT saline        Needle Type: Touhy needle       Needle Gauge: 17.        Needle Length (Inches): 3.5           Catheter threaded easily.             # of attempts: 1 and  # of redirects:  0    Assessment/Narrative         Paresthesias: No.       Insertion/Infusion Method: LORT saline       Aspiration negative for Heme or CSF via Epidural Catheter.    Medication(s) Administered   0.25% Bupivacaine PF (Epidural) - EPIDURAL   10 mL - 4/13/2022 1:44:00 AM

## 2022-04-13 NOTE — PROVIDER NOTIFICATION
04/12/22 2222   Provider Notification   Provider Name/Title    Method of Notification Phone      called unit. Updated on contraction pattern of 2-7 in 10 mins. Pt states she is coping and declines need for analgesia at this time. Updated on plt result of 87 and  ok with waiting more than 4 hours or until morning to place an epidural since plt results are steady. Pt got 12th cytotec dose at 2135, plan to check cervix now and make a plan for cervidil/oxytocin.

## 2022-04-13 NOTE — PROVIDER NOTIFICATION
04/13/22 1433   Provider Notification   Provider Name/Title Олег   Method of Notification At Bedside   Request Evaluate in Person   Notification Reason Other   Fundus firm and deviated to the left of the umbilicus. Scant rubra, no clots.   Dr. Denney at bedside to evaluate.

## 2022-04-13 NOTE — PROVIDER NOTIFICATION
04/12/22 2218   Provider Notification   Provider Name/Title    Method of Notification In Department      updated on plt result of 87. Since plt results steady since this AM,  ok with pt waiting more than 4 hours and even until morning to get the epidural placed.

## 2022-04-13 NOTE — PROGRESS NOTES
SW aware of consult related to fetal loss. Per floor RN parents not ready to discuss disposition and resources at this time. SW will continue to follow & will check back in on 4/14/22. If needs arise prior to then SW remains available to assist as needed.     GEORGETTE Mclean  [facility]  4/13/2022  2:43 PM

## 2022-04-13 NOTE — PROVIDER NOTIFICATION
04/13/22 0721   Provider Notification   Provider Name/Title    Method of Notification Phone   Notification Reason Status Update      updated on pt's status. Neetu is comfortable with her epidural which was inserted at 0147, post epidural SVE with hightower insertion 2/90/-2. Pt starting to feel some pressure with contractions this morning, SVE 5/90/-2 with BBOW. Bloody show and scant amount of fluid noted on pad at 0655. Temp afebrile. BP's mild range, denies pre-E symptoms. Pitocin at 5mu, pt lucie 2-3 in 10 mins palpating moderate. Plan to collect pr/cr urine. Writer will update Paty SAMAYOA.

## 2022-04-13 NOTE — PROVIDER NOTIFICATION
04/12/22 2133   Provider Notification   Provider Name/Title    Method of Notification At Bedside      in room. Discussed option of dry epidural placement with patient in regard to thrombocytopenia and plt trending down. Plan to check platelets now and then will update  with results when pt would like the epidural catheter to be placed.

## 2022-04-13 NOTE — PROVIDER NOTIFICATION
04/13/22 1623   Provider Notification   Provider Name/Title Dr Bhandari   Method of Notification Phone   Request Evaluate - Remote   Notification Reason Lab/Diagnostic Study  (plts 67)     Spoke with MDA on the phone. Let her know that the patients platelets post delivery is 67 and wondering MDA preference with removing the epidural. MDA stating she is going to have the platelets drawn again as well as an INR. Will page MDA when the results are in.    1717 Dr Hendrickson called the unit and spoke to this RN. OK to pull epidural catheter now. Should watch for any back pain, numbness/tingling in the lower extremities, or bowel/bladder issues and let MDA know ASAP if any of these issues arise.

## 2022-04-13 NOTE — PROVIDER NOTIFICATION
04/13/22 1225   Provider Notification   Provider Name/Title ROCAEL   Method of Notification At Bedside   BP REVIEWED

## 2022-04-13 NOTE — PROVIDER NOTIFICATION
04/13/22 1141   Provider Notification   Provider Name/Title ROCAEL   Method of Notification Electronic Page   Notification Reason Status Update   MD updated and is available for delivery.  Labs, vitals, and SVE discussed.

## 2022-04-13 NOTE — PROGRESS NOTES
OB Progress Note  2022  8:31 AM    S:  Pt comfortable with epidural.     O:    Vitals:    22 0545 22 0600 22 0736 22 0808   BP: (!) 147/92  (!) 158/94 (!) 141/78   Pulse: 89      Resp:       Temp:  98.1  F (36.7  C) 98.4  F (36.9  C)    TempSrc:  Oral     SpO2: 99% 98%     Weight:       Height:         Trainer:  Ctx q1-5 min  SVE:  8/100/-2 per RN 0800  Membranes: ruptured per RN    A/P:  32 year old  @41w admitted  with decreased FM, found to have IUFD.     1. IOL/Labor: 8/100/-2 as of 8 Am. No membranes palpated per RN so assuming SROM.  Continuing pitocin augmentation. Reviewed plan of care this AM.    - Will need to clarify autopsy/chromosomes after delivery. Reviewed pt and husbands wishes around time of delivery with RN.   3. Pain control - Pt comfortable with epidural.    4. Gestational HTN - BPs in mild range since admission.  No elevated BPs prior to admission.  Labs normal except low platelets (known gestational thrombocytopenia).  Urine protein sent this AM.  5. Gestational thrombocytopenia - Plts 87K, stable. Epidural in place.   6. GDMA1 - can check 1 post delivery BG and if normal no longer need checks.     Vijaya Tuttle MD  2022

## 2022-04-13 NOTE — PROVIDER NOTIFICATION
04/13/22 1138   Provider Notification   Provider Name/Title hipolito   Method of Notification Electronic Page   Notification Reason Status Update   MD updated & requested Dr Denney be informed as backup for delivery.

## 2022-04-13 NOTE — PROVIDER NOTIFICATION
04/13/22 0853   Provider Notification   Provider Name/Title TRISTIN   Method of Notification At Bedside   Request Evaluate in Person   MD  updated on status & at the bedside to meet patient.

## 2022-04-13 NOTE — PLAN OF CARE
Parents acknowledged they would receive a momento's box with footprints, a lock of hair, and a [facility] care with photos.  Parents are aware that when the infant is born he will be placed in a bassinet wrapped and dressed.  If they decide to view their son they can change their mind at any time.  Culturally they do not prepare for their infant prior to delivery and the naming takes place several days latter.  They are aware that the garett could come in for a naming ceremony if they decide to name their son.    We discussed optional tests that can be done following delivery such as autopsy & chromosomes.  A general examination of the placenta & infant will be done.  Parents have questions regarding testing advanced testings that the MD will adress.  At this time, if there is visable evidence cord accident they may decide to decline the testing.  They know that a decision on these tests can be delayed several hours.     It has been mentioned several times that they will need to decide the disposition of their infant's body prior to discharge but the LSW will assist them in that discission.

## 2022-04-13 NOTE — PROVIDER NOTIFICATION
04/12/22 2008   Provider Notification   Provider Name/Title    Method of Notification Phone      updated on SVE 1-2/60/-1, lyn 6. Pt tolerating contractions at this time and would possibly like to try morphine again. Pt got dizzy with the last 10mg morphine dose so order received for Morphine 5mg IM x1 PRN. TORB to start low dose pitocin at midnight as per protocol and  ok with pt getting an epidural at anytime.

## 2022-04-13 NOTE — PROGRESS NOTES
OB Progress Note  2022  7:25 PM - late entry, patient seen at 5 pm    S:  Pt continues to have lower back back and pain with ctx.  Did receive morphine and vistaril at 3:30 pm.  No VB or LOF.        O:  /80   Pulse 108   Temp 98.5  F (36.9  C) (Oral)   Resp 14   Ht 1.524 m (5')   Wt 59 kg (130 lb)   SpO2 98%   BMI 25.39 kg/m    Micro:  Ctx q1-5 min  SVE:  1/60%/-3, soft, posterior  Membranes: intact    BSUS - vertex      A/P:  32 year old  @40w6d admitted yesterday with decreased FM, found to have IUFD.   1.  Discussed IUFD with patient and  again and questions answered.    2. IOL  - Discussed recommendation to continue with cervical ripening for IOL.  Discussed options for cervical ripening and recommend continuing with oral cytotec at this time as she is due for her 10th dose now and cervical change has been now seen with exam at this time.   Discussed ptiocin when cervix favorable.  Will reassess 2 hours after 12th does of cytotec (11 pm).   3. Pain control - discussed epidural when in labor.  For now, received morphine IM and vistaril at 330 pm.  Discussed other options of fentanyl IV, PCA.    4. Gestational HTN - BPs in mild range since admission.  No elevated BPs prior to admission.  Labs normal except low platelets (known gestational thrombocytopenia).  Will continue to closely monitor.  5. Gestational thrombocytopenia - Plts 87K this morning and 86K at noon.  Anesthesia aware and states can have regional anesthesia if plts remain >50K.    Alondra Denney MD

## 2022-04-14 VITALS
BODY MASS INDEX: 25.52 KG/M2 | DIASTOLIC BLOOD PRESSURE: 77 MMHG | HEIGHT: 60 IN | TEMPERATURE: 98.1 F | OXYGEN SATURATION: 99 % | SYSTOLIC BLOOD PRESSURE: 119 MMHG | WEIGHT: 130 LBS | RESPIRATION RATE: 12 BRPM | HEART RATE: 101 BPM

## 2022-04-14 PROBLEM — O36.4XX0 IUFD AT 20 WEEKS OR MORE OF GESTATION: Status: ACTIVE | Noted: 2022-04-14

## 2022-04-14 LAB
B19V IGG SER IA-ACNC: 0.96 IV
B19V IGM SER IA-ACNC: 0.59 IV
ERYTHROCYTE [DISTWIDTH] IN BLOOD BY AUTOMATED COUNT: 14.3 % (ref 10–15)
HCT VFR BLD AUTO: 33.7 % (ref 35–47)
HGB BLD-MCNC: 11.8 G/DL (ref 11.7–15.7)
MCH RBC QN AUTO: 29.9 PG (ref 26.5–33)
MCHC RBC AUTO-ENTMCNC: 35 G/DL (ref 31.5–36.5)
MCV RBC AUTO: 85 FL (ref 78–100)
PLATELET # BLD AUTO: 86 10E3/UL (ref 150–450)
RBC # BLD AUTO: 3.95 10E6/UL (ref 3.8–5.2)
WBC # BLD AUTO: 14.7 10E3/UL (ref 4–11)

## 2022-04-14 PROCEDURE — 85014 HEMATOCRIT: CPT | Performed by: OBSTETRICS & GYNECOLOGY

## 2022-04-14 PROCEDURE — 250N000013 HC RX MED GY IP 250 OP 250 PS 637: Performed by: OBSTETRICS & GYNECOLOGY

## 2022-04-14 PROCEDURE — 722N000001 HC LABOR CARE VAGINAL DELIVERY SINGLE

## 2022-04-14 PROCEDURE — 36415 COLL VENOUS BLD VENIPUNCTURE: CPT | Performed by: OBSTETRICS & GYNECOLOGY

## 2022-04-14 RX ORDER — IBUPROFEN 800 MG/1
800 TABLET, FILM COATED ORAL EVERY 6 HOURS PRN
COMMUNITY
Start: 2022-04-14 | End: 2022-11-25

## 2022-04-14 RX ORDER — DOCUSATE SODIUM 100 MG/1
100 CAPSULE, LIQUID FILLED ORAL 2 TIMES DAILY PRN
COMMUNITY
Start: 2022-04-14 | End: 2022-11-25

## 2022-04-14 RX ORDER — ACETAMINOPHEN 325 MG/1
650 TABLET ORAL EVERY 6 HOURS PRN
COMMUNITY
Start: 2022-04-14

## 2022-04-14 RX ADMIN — IBUPROFEN 800 MG: 800 TABLET, FILM COATED ORAL at 07:26

## 2022-04-14 RX ADMIN — ACETAMINOPHEN 650 MG: 325 TABLET, FILM COATED ORAL at 01:49

## 2022-04-14 RX ADMIN — LEVOTHYROXINE SODIUM 88 MCG: 0.09 TABLET ORAL at 07:25

## 2022-04-14 RX ADMIN — IBUPROFEN 800 MG: 800 TABLET, FILM COATED ORAL at 01:49

## 2022-04-14 RX ADMIN — ACETAMINOPHEN 650 MG: 325 TABLET, FILM COATED ORAL at 12:35

## 2022-04-14 RX ADMIN — DOCUSATE SODIUM 100 MG: 100 CAPSULE, LIQUID FILLED ORAL at 07:26

## 2022-04-14 ASSESSMENT — ACTIVITIES OF DAILY LIVING (ADL)
ADLS_ACUITY_SCORE: 4

## 2022-04-14 NOTE — ANESTHESIA POSTPROCEDURE EVALUATION
Patient: Neetu Neetu    Procedure: * No procedures listed *       Anesthesia Type:  No value filed.    Note:  Disposition: Inpatient   Postop Pain Control: Uneventful            Sign Out: Well controlled pain   PONV: No   Neuro/Psych: Uneventful            Sign Out: Acceptable/Baseline neuro status   Airway/Respiratory: Uneventful            Sign Out: Acceptable/Baseline resp. status   CV/Hemodynamics: Uneventful            Sign Out: Acceptable CV status   Other NRE: NONE   DID A NON-ROUTINE EVENT OCCUR? No    Event details/Postop Comments:  Labor epidural analgesia satisfactory.  L&D RN removed epidural catheter.  No residual sensorimotor blockade.  Patient denies headache, fever or chills.  She will notify postpartum RN of any problems or questions.           Last vitals:  Vitals Value Taken Time   BP     Temp     Pulse     Resp     SpO2         Electronically Signed By: Alfred Corral MD  April 14, 2022  9:32 AM

## 2022-04-14 NOTE — PLAN OF CARE
Infant transported to the [facility] with ANS after verifing proper identifications were in place.    LSW is meeting with the family to determine  home and disposition of infant.  When a  home has been identified the final papers will be delivered to the ANS so the infant can be transferred for autopsy.

## 2022-04-14 NOTE — DISCHARGE INSTRUCTIONS
" Loss Discharge Instructions   We recommend you see your provider to check on your physical and emotional recovery, and to walk through your experience next Tuesday or Wednesday     The Blues and Grief  After delivery you will experience hormone changes and strong emotions, often referred to as the \"baby blues\".  You may find yourself easily upset, tearful or angry.  In addition, you will be grieving for your own loss and may feel terribly tired and not want to face friends, family or new babies.    Your feelings will be hard to predict during this time.  You may have many ups and downs.  Be kind and patient with yourself.    No two people grieve the same way.  This is true of spouses and partners.  Try to have open communication, but don't depend solely on each other for support.  Reach out to friends, family, clergy and your health care providers.  You don't have to handle this alone.    Normal grief after a pregnancy or  loss often lasts for weeks or months.  Over time, you will have more good days than bad ones.  The first year is usually the hardest as you face significant dates and events for the first time.    At first, your sadness or anxiety may keep you from sleeping, eating, being with others or getting out of the house.  If, after a week, you aren't able to take care of basic daily tasks, please call your care provider right away.  It could be more serious than the blues or grief.  Going back to work:  Even though you did not bring home a living baby, you and your partner have a right to any family and bereavement leave benefits your employer offers.  When you do return to work, be prepared for some adjustment time.    Call your health care provider if you have any of these symptoms:  You soak a sanitary pad with blood within 1 hour, or you see blood clots larger than a golf ball.  Bleeding that lasts more than 6 weeks.  You have vaginal discharge that smells bad.   A fever above 100.4 F " (38 C), with or without chills  Severe, pain, cramping or tenderness in your lower belly area.  If you have pain that increases or does not go away from an episiotomy or perineal tear  Increased pain, swelling, redness or fluid around your stitches.  A need to urinate more frequently (use the toilet more often), more urgently (use the toilet very quickly), or it burns when you urinate.  Redness, swelling or pain around a vein in your leg.  Problems with coping with sadness, anxiety, or depression.  Your breasts are engorged (hard and swollen), red and very tender and you have a fever.   If you have nausea and vomiting.  If you have chest pain and cough or are gasping for air.    Preeclampsia   Call your doctor right away if you have any of the following:  - Edema (swelling) in your face or hands  - Rapid weight gain-about 1 pound or more in a day  - Headache  - Abdominal pain on your right side  - Vision problems (flashes or spots)  - You have questions or concerns once you return home..  Any further recommendations for follow up will be discussed with your provider at that time.    Keep your hands clean:  Always wash your hands before touching your perineal area and stitches.  This helps reduce your risk of infection.  If your hands aren't dirty, you may use an alcohol hand-rub to clean your hands. Keep your nails clean and short.

## 2022-04-14 NOTE — PLAN OF CARE
Parents have chosen [facility]. Record of Death was completed and a copy along with a copy of the autopsy consent was given to the ANS to accompany the infant @ 1110.     Certificate of Birth resulting in Stillbirth form was completed and notarized.  Form submitted.

## 2022-04-14 NOTE — L&D DELIVERY NOTE
Delivery Date: 04/13/2022    HISTORY:  This patient is a 32-year-old G1, P0 at 41 weeks' gestation, who presented to labor and delivery on 04/11/2022 with approximately three hours of decreased fetal movement.  On arrival in Labor and Delivery, fetal heart tones were unable to be found.  An ultrasound was performed, which confirmed fetal demise.    The patient's pregnancy was complicated by:  1.  Hypothyroidism, for which she is on levothyroxine and is followed by Endocrine.  2.  Gestational diabetes, diet controlled with excellent control.  3.  Gestational thrombocytopenia with platelets at her new OB exam 139.  Platelets were 132 at 22 weeks, 118 at 28 weeks, 107 at 32 weeks and 114 at 36 weeks.    LABOR COURSE:  On arrival in Labor and Delivery, again, the patient was found to have a fetal demise with no cardiac activity on ultrasound.  The patient denied any trauma or pain.  She had no vaginal bleeding or leakage of fluid.  She had felt some contractions, which were mild.  She stated that fetal movement was normal the day prior to admission as well as the morning of admission.  Options were discussed with the patient and she desired to stay in the hospital and begin induction of labor.    Initial laboratory studies were drawn to include parvovirus, IgM and IgG which are pending.  An INR was normal at 0.89.  Hemoglobin was normal at 16.2.  White blood cell count was normal at 6.5.  The patient's platelet count was found to be low at 101.  Of note, she again does have a history of gestational thrombocytopenia.  PTT were normal as well as a fibrinogen activity.  Her TSH was checked and was 1.98.  Blood type was checked and is B positive with negative antibody screen.  A comprehensive metabolic panel was done with all normal values.  Creatinine was 0.78 and AST was 32, ALT 22.  COVID test was done and was negative.  A KUB was done, which did show a slight increased fetal red blood cell percentage at 0.1%.  The red  blood cell mL was 3.  A U-Tox was performed and was negative.  Treponemal antibody was nonreactive.  The patient did have testing as well for lupus anticoagulant, which was negative.  Her anticardiolipin antibodies were negative.  Her beta 2 glycoprotein antibodies were negative.    On admission, the patient was noted to have a new onset of mildly elevated blood pressures.  These ranged in the 140s/80s-90s for the most part.  No severe range blood pressures were noted during the patient's first stage of labor.  Of note, on the morning of 04/13/2022, a protein to creatinine ratio was sent, which was elevated at 0.96.    Again, the patient's platelet count was also followed throughout her labor.  On the morning of 04/12/2022, her platelets were 87,000.  These stayed stable throughout the day.  They were rechecked at approximately 10 p.m. on 04/12/2022 and remained 87,000.    Cervical ripening was started on the evening of 04/11/2022.  The patient was given a total of 12 doses of oral Cytotec.  On the afternoon of 04/12/2022, the patient was becoming uncomfortable.  She did receive morphine 10 mg IM and Vistaril 100 mg orally.  At approximately 5 p.m., she was evaluated by myself, and at that time was relaxed and a fairly comfortable after receiving pain medication.  Her cervix was 1 cm, 60%, -3 station, soft and posterior with a Palomino of 6.  Plan was made to continue cervical ripening to finish 12 total doses of oral Cytotec.  A bedside ultrasound was done at that time, which did confirm vertex presentation.  The patient continued with her Cytotec and after her 12th dose, her cervix was found to be 1-2/50 percent/-1 station.  The patient was given another dose of morphine 5 mg IM x1.  She also was started on Pitocin at approximately midnight and was on a maximum of 5 milliunits a minute.  The patient continued to become more uncomfortable and at 1:47 a.m. on 04/13/2022, she received an epidural.  Cervix was rechecked  after epidural as she was 2/90/-2 station.  She was starting to feel some contractions and cervix was rechecked, and she was 5/90 percent/-2 station with a bulging bag of water.  Fluid was noted on her pad at approximately 7 a.m. and spontaneous rupture of membranes had occurred.  The patient continued laboring, and at 8 a.m. was found to be 8 cm/100 percent/-2 station.  No membranes were felt and therefore again spontaneous rupture of membranes was confirmed.  The patient continued laboring and was comfortable with her epidural.  At 11:26 a.m., the patient was found to be completely dilated and +2 station.    SECOND STAGE:  The patient began pushing at 12:12 p.m.  I was contacted and asked to present to Labor and Delivery.  On my arrival, the patient was pushing well.  The fetal vertex was present at the introitus.  The patient continued pushing.  She slowly brought the fetal vertex to a full crown.  As the head was , it was clear that there was some significant scalp edema present.  The head then delivered at 1:01 p.m.  Following delivery of the head, it was clear that significant edema of the head was present as well as significant laxity in the skull bones.  After delivery of the head, the anterior shoulder did not immediately deliver.  Another contraction was waited for.  With the next contraction, Jony and suprapubic pressure was done.  This also did not result in delivery of the anterior shoulder.  Contraction was completed, and with the next contraction, this was again attempted.  Again, no delivery occurred, and therefore the posterior shoulder was swept out and delivered.  With the next contraction, the anterior shoulder was able to be delivered, and the remainder of the baby delivered.  The cord was clamped and cut and the baby was placed in a bassinet.  Apgars were 0.  Baby was a male.  He weighed 7 pounds 4 ounces.  He was 22 inches long.    THIRD STAGE:  Placenta then delivered intact with  a 3-vessel cord at 1:09 p.m.  Examination of the placenta showed no abnormalities.  Of note, the cord was wrapped around the baby's feet, but was loose.  The perineum was then inspected and there was a small left periurethral laceration, which was hemostatic and not repaired.  A second-degree perineal laceration was also present and this was repaired with 3-0 Vicryl in the usual fashion.  Estimated blood loss was 100 mL for delivery.    Following delivery, the baby was taken to a separate room to be examined.  On my examination, again the head was significantly elongated with significant laxity of the skull bones and swelling of the skin.  The face appeared normal with normal features.  Ears were normal.  The limbs, feet and hands appeared normal.  Fingers and toes appeared normal.  The baby's back was intact and completely normal.  No significant abnormalities were seen other than the head as described above.  There was some edema of the skin and a small amount of breakdown of the skin, especially around the baby's neck.  No other abnormalities were noted.    A discussion of these findings were had with the patient and the father.  Immediately following delivery, the father did wish to see the baby, but at the time of this dictation, the patient has not yet seen the baby.  Discussion of further testing was done and the patient and  do desire autopsy and chromosomal testing.  Consent was obtained for these tests and they were ordered.    The patient tolerated the delivery well.  She is stable in the delivery suite at this time following delivery.    Alondra Denney MD        D: 2022   T: 2022   MT: JEFFREY    Name:     JERED LAWTON  MRN:      [id number]        Account:       [id number]   :      1990           Delivery Date: 2022     Document: [id number]

## 2022-04-14 NOTE — PLAN OF CARE
Discharge instructions completed.  Patient states she understands all discharge instructions. And all her questions have been answered.  Patient verbalized her followup with Dr Denney is scheduled.  Patient was discharged to home by wheelchair at 1230.

## 2022-04-14 NOTE — CARE PLAN
Vital signs within normal limits. Postpartum checks WNL. Pt eating and drinking normally. Pt voiding. Denies any headache, back pain, or numbness or tingling to legs. Pt using tylenol and ibuprofen for cramping and perineum pain. Ice pack and tucks pads given. CBC and plt check this AM. IV SL. Momento's box discussed and given to pt. Support person Abraham present and attentive to patient. Pending SW consult this AM.

## 2022-04-14 NOTE — PROGRESS NOTES
OB Post-partum Note  PPD# 1    S:  Physically recovering well. Pain controlled with tylenol/motrin. Bleeding minimal. Eating and drinking w/o difficulty. Voiding. Denies breast engorgement. Has finished all ppwk and met with SW.     O:  /77   Pulse 101   Temp 98.1  F (36.7  C) (Oral)   Resp 12   Ht 1.524 m (5')   Wt 59 kg (130 lb)   SpO2 99%   BMI 25.39 kg/m    Gen- A&O, NAD  Abd- Non-tender, fundus non tender and firm   Ext- non-tender, no calf pain     Hemoglobin   Date Value Ref Range Status   2022 11.8 11.7 - 15.7 g/dL Final     B POS  Rubella Immune    A/P: 32 year old  PPD# 1 s/p  after IUFD at 40w6d. Pregnancy complicated by: hypothryoidism, A1GDM, and gest thrombocytopenia    1.  Routine post-partum cares  2.  Analgesia Tylenol/Motrin  3.  Hypothyroid: continue home synthroid  4. A1GDM: no further BG checks, FBG WNL  5. Platelets: stable (last 86)  6. IUFD: s/p SW consult. Consented for autopsy and chromosomes. Support given  7. Possible gHTN: BP's now stable. Urine p:c ratio 0.96  8.  The plan of care was discussed with the patient.  She expressed understanding and agreement  5.  RTC in 1 week      Jennifer Ventura MD  2022  10:09 AM

## 2022-04-14 NOTE — PLAN OF CARE
Assumed cares at 0715 after bedside report.  Patient states she rested well & pain is minimal but would take Ibuprofen prophylactically.  VVS stable, uterus WDL and perineum tender.  Patient denies any numbness/tingeling in the legs or pain in the back at this time.     Parents stated that they are ready for their son to go to the [facility].  Nursing supervisor was contacted regarding transport and autopsy.     LSW was contacted and will see the parents this am.

## 2022-04-14 NOTE — ANESTHESIA PREPROCEDURE EVALUATION
Anesthesia Pre-Procedure Evaluation    Patient: Neetu D eAnda   MRN: [id number] : 1990        Procedure : * No procedures listed *          Past Medical History:   Diagnosis Date     Diabetes (H)     gestational diabetes, diet controlled     Thyroid disease     hypothyroidism      Past Surgical History:   Procedure Laterality Date     ENT SURGERY      nasal surgery 2018      No Known Allergies   Social History     Tobacco Use     Smoking status: Never Smoker     Smokeless tobacco: Never Used   Substance Use Topics     Alcohol use: Not Currently      Wt Readings from Last 1 Encounters:   22 59 kg (130 lb)              OUTSIDE LABS:  CBC:   Lab Results   Component Value Date    WBC 14.7 (H) 2022    WBC 7.4 2022    HGB 11.8 2022    HGB 13.5 2022    HCT 33.7 (L) 2022    HCT 38.5 2022    PLT 86 (L) 2022    PLT 74 (L) 2022     BMP:   Lab Results   Component Value Date     2022    POTASSIUM 3.8 2022    CHLORIDE 111 (H) 2022    CO2 15 (L) 2022    BUN 12 2022    CR 0.78 2022     (H) 2022     (H) 2022     COAGS:   Lab Results   Component Value Date    PTT 28 2022    INR 0.95 2022    FIBR 484 2022     POC: No results found for: BGM, HCG, HCGS  HEPATIC:   Lab Results   Component Value Date    ALBUMIN 3.0 (L) 2022    PROTTOTAL 7.6 2022    ALT 22 2022    AST 32 2022    ALKPHOS 169 (H) 2022    BILITOTAL 0.2 2022     OTHER:   Lab Results   Component Value Date    BETZY 9.4 2022    TSH 1.98 2022       Anesthesia Plan    ASA Status:  2      Anesthesia Type: Epidural.              Consents            Postoperative Care            Comments:                Ginny Bhandari MD

## 2022-04-14 NOTE — CONSULTS
INITIAL SOCIAL WORK MATERNITY ASSESSMENT     DATA:      Reason for Social Work Consult: fetal loss at 40W 6D gestation     Presenting Information: This was Neetu Rosario's first baby.     Living Situation: Home with Neetu Rosario. Abraham's parents are also here from [place] and will be staying with them for a few months before returning to home.      Social Support/Professional Community Support:  Abraham shared they do not have any immediate family in the area but will have the support of his parent's who will be staying with them for the next couple of months.     Insurance: [employer]     Source of Financial Support: Abraham's full time employment     Mental Health History: denied      History of Postpartum Mood Disorders: not applicable      Chemical Health History: none        INTERVENTION:        SW completed chart review and collaborated with the multidisciplinary team.     Psychosocial Assessment     Introduction to Maternal Child Health  role and scope of practice     Reviewed Hospital and Community Resources     Assessed Chemical Health History and Current Symptoms     Assessed Mental Health History and Current Symptoms     Identified stressors, barriers and family concerns     Provided support and active empathetic listening and validation.     Provided psychoeducation on  mood and anxiety disorders, assessed for any current symptoms or history    ASSESSMENT:      Coping: functional      Affect:  solemn, appropriate     Mood:   appropriate, reserved     Assessment of Support System: stable, limited     Level of engagement with SW:  They were engaged & appropriate with SW.      Assessment of parental risk for PMAD: higher than average risk due to unexpected loss          PLAN:       SW met with Neetu BLANCO & SAQIB Abraham to discuss support resources & disposition of baby. Abraham shared that in their culture since baby was not born alive and is small they will bury baby instead of cremation.  He voiced they are unfamiliar with how to plan a service or what to ask a  home. Abraham was agreeable to SW assisting in contacting  homes but requested to also be present on speaker phone so he could still get all of the information first hand. ERI assisted Abraham with contacting [facility] in [place] per his request. He has decided to use this  home and declined the offer to check with additional options. Abraham is aware he will still need to pick a cemetary & would update [facility] with this information once it is chosen. They were agreeable to offer of the casket provided by hospital. Floor Rn confirmed she believed baby would be able to fit inside & Abraham looked over casket. SW contacted [facility] to update that family had chosen them & that hospital casket would be provided. [facility] requested casket be placed in the morgue for them to transfer when they transferred baby. SW updated security & provided security with casket.     SW also reviewed postpartum depression & anxiety information. SW discussed pregnancy & [place] support [place], the [employer], & provided [facility] [place] Support [place] resource list. Neetu voiced appreciation for information but declined SW's offer to send a referral to [employer]. She stated she will reach out to them or to  if additional support is needed after discharge. SW also provided SW's contact information & encouraged Neetu & Abraham to call at anytime with questions or if additional resources are needed. SW remains available if additional needs arise. They denied any further questions or concerns at this time.    GEORGETTE Mclean  [facility]  2022  11:50 AM

## 2022-04-14 NOTE — PLAN OF CARE
GEORGETTE has spoken with the family and they would like to have the infant casket we provide.  Meliza Champion & they requested the casket stay in our morgue so that when in infant is returned Jaycee can  both.

## 2022-05-09 NOTE — DISCHARGE SUMMARY
Service Date: 2022  Discharge Date: 2022    ADMISSION DIAGNOSES:    1.  Intrauterine pregnancy at 40+5 weeks' gestation.  2.  Decreased fetal movement.  3.  Fetal demise.    DIAGNOSES:    1.  Intrauterine pregnancy at 40+5 weeks' gestation.  2.  Decreased fetal movement.  3.  Fetal demise.    HISTORY:  This patient is a 32-year-old , admitted at 40+5 weeks' gestation with decreased fetal movement.    This patient's pregnancy was complicated by:  1.  Hypothyroidism, for which she is on levothyroxine.  2.  Gestational diabetes, diet controlled with excellent control.  3.  Gestational thrombocytopenia with platelets at her new OB exam of 139.  Platelets were 114 at 36 weeks' gestation.    HOSPITAL COURSE:  On arrival in Labor and Delivery, the patient complained of decreased fetal movement.  Fetal heart tones were not able to be found.  An ultrasound was done, which confirmed fetal demise with no cardiac activity.  The patient denied any trauma or pain.  She had no vaginal bleeding or leakage of fluid.  She had felt some contractions.  She had felt movement earlier in the morning prior to presentation.    The patient was admitted to Labor and Delivery and initial laboratory studies were drawn.  INR was normal.  Hemoglobin was normal.  White blood cell count was normal.  The patient's platelet count was found to be low at 101.  PTT were normal and fibrinogen was normal.  TSH was normal at 1.98.  Blood type was B positive with negative antibody screen.  Creatinine was 0.78.  ALT and AST were normal.  COVID test was negative.  A KB was drawn, which showed a slight increase of fetal red blood cells with a percentage of 0.1%.  U-Tox was performed and was negative.  Treponemal antibody was nonreactive.  Lupus anticoagulant testing was negative, as well as anticardiolipin antibodies and beta 2 glycoprotein.    The patient was noted to have new onset of mildly elevated blood pressures on admission.  These  ranged in the 140s-180s over 90s.  No severe range blood pressures were present.  On the morning of 2022, a protein to creatinine ratio was sent, which was elevated at 0.96.    The patient's platelet count was followed throughout her labor and did drop to 87,000.    Cervical ripening was started the evening of 2022.  The patient received 12 doses of oral Cytotec.  On the afternoon of 2022, the patient became uncomfortable.  She received morphine and Vistaril.  At approximately 5:00 p.m., she was evaluated, and at that time, her cervix was 1/60/-3 station.  After the 12th dose of Cytotec, the patient's cervix was 1-2/50 percent/ -1 station.  The patient was then started on Pitocin.  She was given a maximum of 5 milliunits a minute.  She became uncomfortable and received an epidural early in the morning of 2022.  Spontaneous rupture of membranes happened at approximately 7 a.m. on 2022.  The patient continued laboring and at 8:00 a.m. was 8 cm.  The patient was comfortable.  At 11:26 she was found to be completely dilated.    The patient began pushing at 12:12 p.m.  She brought the fetal vertex to a crown.  As the head was , significant scalp edema was noted.  The head delivered at 1:01 p.m.  After delivery of the head, significant edema of the head was noted to be present, as well as laxity in the skull bones.  Anterior shoulder did not immediately deliver.  Another contraction was awaited for.  With the next contraction, Jony and suprapubic pressure was done.  This did not result in delivery of the anterior shoulder.  With the next contraction, this was again attempted and no delivery occurred.  At this point, the posterior shoulder was swept down and delivered.  With the next contraction, the anterior shoulder was able to be delivered and the remainder of the baby delivered.  The cord was clamped and cut and baby placed in the bassinet.  Apgars were 0.  Baby was a male.   He weighed 7 pounds 4 ounces.  He was 22 inches long.    POSTOPERATIVE COURSE:  Discussions were had with the patient and her  regarding evaluation of the fetus following delivery.  They did wish to proceed with chromosomal evaluation, as well as an autopsy.  Support was given and all questions were answered as able.  On 2022, the patient was ready for discharge to home.  Her hemoglobin was 11.8.  She is blood type B positive and rubella immune.  The patient was using Tylenol and Motrin for pain control.  Her gestational diabetes will be evaluated for a postpartum checkup.  Her platelets remained stable with the last check at 86,000.  All questions were answered to the best of ability and the patient was discharged to home.  It was recommended that she follow up in the clinic in one week for close followup and the patient did agree with this.    Again, the patient was discharged to home in stable condition on 2022.  She will follow up in the office was within one week.    Alondra Denney MD        D: 2022   T: 2022   MT: [id number]    Name:     JERED LAWTON  MRN:      [id number]        Account:      [id number]   :      1990           Service Date: 2022                                  Discharge Date: 2022     Document: [id number]

## 2022-05-12 PROCEDURE — 88307 TISSUE EXAM BY PATHOLOGIST: CPT | Mod: 26 | Performed by: PATHOLOGY

## 2022-05-19 LAB
ADDITIONAL COMMENTS: NORMAL
INTERPRETATION: NORMAL
ISCN: NORMAL
METHODS: NORMAL

## 2022-05-19 PROCEDURE — 88291 CYTO/MOLECULAR REPORT: CPT | Performed by: MEDICAL GENETICS

## 2022-07-28 ENCOUNTER — VIRTUAL VISIT (OUTPATIENT)
Dept: FAMILY MEDICINE | Facility: CLINIC | Age: 32
End: 2022-07-28
Payer: COMMERCIAL

## 2022-07-28 DIAGNOSIS — U07.1 INFECTION DUE TO 2019 NOVEL CORONAVIRUS: Primary | ICD-10-CM

## 2022-07-28 DIAGNOSIS — O24.419 GESTATIONAL DIABETES MELLITUS (GDM), ANTEPARTUM, GESTATIONAL DIABETES METHOD OF CONTROL UNSPECIFIED: ICD-10-CM

## 2022-07-28 PROCEDURE — 99203 OFFICE O/P NEW LOW 30 MIN: CPT | Mod: 95 | Performed by: PHYSICIAN ASSISTANT

## 2022-07-28 RX ORDER — GUAIFENESIN/DEXTROMETHORPHAN 100-10MG/5
10 SYRUP ORAL EVERY 4 HOURS PRN
Qty: 473 ML | Refills: 0 | Status: SHIPPED | OUTPATIENT
Start: 2022-07-28 | End: 2022-11-25

## 2022-07-28 NOTE — PATIENT INSTRUCTIONS
Start paxlovid TODAY to reduce risk from covid  If you test negative today or tomorrow, you are likely no longer contagious  If symptoms go away but come back, stay in your home for 5 days    Cough syrup prescribed    Work note provided  If worsening symptoms or questions, reach out  See attached info on covid

## 2022-07-28 NOTE — LETTER
[facility]  [address] [place] [id number]  Phone: [phone]  Fax: [phone]    07/28/22    Neetu Neetu  [address] [place] [id number]      To whom it may concern:     Neetu has been out with covid since Saturday 7/23/22.    Sincerely,      Kelsie Hwang PA-C

## 2022-07-28 NOTE — PROGRESS NOTES
Neetu is a 32 year old who is being evaluated via a billable video visit.      How would you like to obtain your AVS? MyChart  If the video visit is dropped, the invitation should be resent by: Text to cell phone: [phone]  Will anyone else be joining your video visit? No    Assessment & Plan     Infection due to 2019 novel coronavirus  History recent Gestational diabetes mellitus (GDM), antepartum, gestational diabetes method of control unspecifiedm, and HTN in minority patient.   - nirmatrelvir and ritonavir (PAXLOVID) therapy pack; Take 3 tablets by mouth 2 times daily for 5 days (Take 2 Nirmatrelvir tablets and 1 Ritonavir tablet twice daily for 5 days)  - guaiFENesin-dextromethorphan (ROBITUSSIN DM) 100-10 MG/5ML syrup; Take 10 mLs by mouth every 4 hours as needed for cough    Patient Instructions   Start paxlovid TODAY to reduce risk from covid  If you test negative today or tomorrow, you are likely no longer contagious  If symptoms go away but come back, stay in your home for 5 days    Cough syrup prescribed    Work note provided  If worsening symptoms or questions, reach out  See attached info on covid      No follow-ups on file.    Kelsie Hwang PA-C  [facility]    Subjective   Neetu is a 32 year old accompanied by her spouse, presenting for the following health issues:  Covid Concern      HPI     COVID-19 Symptom Review  How many days ago did these symptoms start? Had mild URI symptoms since last Saturday  Are any of the following symptoms significant for you?    New or worsening difficulty breathing? No    Worsening cough? Yes, I am coughing up mucus.    Fever or chills? No    Headache: brief on Tues    Sore throat: YES    Chest pain: No    Diarrhea: No    Body aches? YES    Mild runny nose and mild cough and mild body ache starting Sat.  Slight chills on Sat.  Can't smell.      What treatments has patient tried? Medication from [place]- certizine OTC   Does patient  live in a nursing home, group home, or shelter? No  Does patient have a way to get food/medications during quarantined? Yes, I have a friend or family member who can help me.    Gestational diabetes.  Delivered in April - baby passed.        Objective           Vitals:  No vitals were obtained today due to virtual visit.          Video-Visit Details    Video Start Time: 12:53 PM    Type of service:  Video Visit    Video End Time:1:13 PM    Originating Location (pt. Location): Home    Distant Location (provider location):  [facility]     Platform used for Video Visit: MisaelWell    Sulaiman Hilario.

## 2022-09-01 LAB
PATH REPORT.ADDENDUM SPEC: NORMAL
PATH REPORT.COMMENTS IMP SPEC: NORMAL
PATH REPORT.COMMENTS IMP SPEC: NORMAL
PATH REPORT.FINAL DX SPEC: NORMAL
PATH REPORT.GROSS SPEC: NORMAL
PATH REPORT.MICROSCOPIC SPEC OTHER STN: NORMAL
PATH REPORT.RELEVANT HX SPEC: NORMAL
PHOTO IMAGE: NORMAL

## 2022-09-24 DIAGNOSIS — E06.3 HYPOTHYROIDISM DUE TO HASHIMOTO'S THYROIDITIS: ICD-10-CM

## 2022-09-26 RX ORDER — LEVOTHYROXINE SODIUM 88 UG/1
TABLET ORAL
Qty: 30 TABLET | Refills: 0 | OUTPATIENT
Start: 2022-09-26

## 2022-10-03 ENCOUNTER — HEALTH MAINTENANCE LETTER (OUTPATIENT)
Age: 32
End: 2022-10-03

## 2022-10-12 ENCOUNTER — TELEPHONE (OUTPATIENT)
Dept: ENDOCRINOLOGY | Facility: CLINIC | Age: 32
End: 2022-10-12

## 2022-10-12 DIAGNOSIS — E06.3 HYPOTHYROIDISM DUE TO HASHIMOTO'S THYROIDITIS: Primary | ICD-10-CM

## 2022-10-12 NOTE — TELEPHONE ENCOUNTER
[facility] Call Center    Phone Message    May a detailed message be left on voicemail: no     Reason for Call: Order(s): Other:     Reason for requested: Per Patient is wanting to have lab orders put in to have done. Patient is wanting to get a call back when this has been done to schedule. Please advise    Date needed: asap    Provider name: Martita      Action Taken: Message routed to:  [facility] Endo    Travel Screening: Not Applicable

## 2022-10-13 NOTE — TELEPHONE ENCOUNTER
10.13 lmtcb x1, has lab appt scheduled for 10/24, still need sa follow up appt with Dr. Anders scheduled before orders will be placed.

## 2022-10-24 ENCOUNTER — LAB (OUTPATIENT)
Dept: LAB | Facility: CLINIC | Age: 32
End: 2022-10-24
Payer: COMMERCIAL

## 2022-10-24 DIAGNOSIS — E06.3 HYPOTHYROIDISM DUE TO HASHIMOTO'S THYROIDITIS: ICD-10-CM

## 2022-10-24 PROCEDURE — 84439 ASSAY OF FREE THYROXINE: CPT

## 2022-10-24 PROCEDURE — 84443 ASSAY THYROID STIM HORMONE: CPT

## 2022-10-24 PROCEDURE — 36415 COLL VENOUS BLD VENIPUNCTURE: CPT

## 2022-10-26 DIAGNOSIS — E06.3 HYPOTHYROIDISM DUE TO HASHIMOTO'S THYROIDITIS: ICD-10-CM

## 2022-10-26 LAB
T4 FREE SERPL-MCNC: 1.18 NG/DL (ref 0.76–1.46)
TSH SERPL DL<=0.005 MIU/L-ACNC: 4.51 MU/L (ref 0.4–4)

## 2022-10-26 RX ORDER — LEVOTHYROXINE SODIUM 88 UG/1
TABLET ORAL
Qty: 30 TABLET | Refills: 0 | Status: SHIPPED | OUTPATIENT
Start: 2022-10-26 | End: 2022-11-03

## 2022-10-26 NOTE — TELEPHONE ENCOUNTER
Component      Latest Ref Rng & Units 10/24/2022   TSH      0.40 - 4.00 mU/L 4.51 (H)   T4 Free      0.76 - 1.46 ng/dL 1.18       Pt taking 88mcg, Please advise on dosage.

## 2022-11-04 DIAGNOSIS — E06.3 HYPOTHYROIDISM DUE TO HASHIMOTO'S THYROIDITIS: Primary | ICD-10-CM

## 2022-11-19 DIAGNOSIS — E06.3 HYPOTHYROIDISM DUE TO HASHIMOTO'S THYROIDITIS: ICD-10-CM

## 2022-11-21 RX ORDER — LEVOTHYROXINE SODIUM 88 UG/1
TABLET ORAL
Qty: 30 TABLET | OUTPATIENT
Start: 2022-11-21

## 2022-11-25 ENCOUNTER — OFFICE VISIT (OUTPATIENT)
Dept: INTERNAL MEDICINE | Facility: CLINIC | Age: 32
End: 2022-11-25
Payer: COMMERCIAL

## 2022-11-25 VITALS
DIASTOLIC BLOOD PRESSURE: 84 MMHG | HEIGHT: 60 IN | SYSTOLIC BLOOD PRESSURE: 121 MMHG | RESPIRATION RATE: 18 BRPM | TEMPERATURE: 98.1 F | BODY MASS INDEX: 21.2 KG/M2 | HEART RATE: 80 BPM | OXYGEN SATURATION: 100 % | WEIGHT: 108 LBS

## 2022-11-25 DIAGNOSIS — L65.9 HAIR LOSS: ICD-10-CM

## 2022-11-25 DIAGNOSIS — Z00.00 ROUTINE GENERAL MEDICAL EXAMINATION AT A HEALTH CARE FACILITY: Primary | ICD-10-CM

## 2022-11-25 DIAGNOSIS — M67.431 GANGLION CYST OF WRIST, RIGHT: ICD-10-CM

## 2022-11-25 LAB
ALBUMIN SERPL BCG-MCNC: 4.7 G/DL (ref 3.5–5.2)
ALP SERPL-CCNC: 89 U/L (ref 35–104)
ALT SERPL W P-5'-P-CCNC: 23 U/L (ref 10–35)
ANION GAP SERPL CALCULATED.3IONS-SCNC: 11 MMOL/L (ref 7–15)
AST SERPL W P-5'-P-CCNC: 35 U/L (ref 10–35)
BILIRUB SERPL-MCNC: 0.3 MG/DL
BUN SERPL-MCNC: 11.4 MG/DL (ref 6–20)
CALCIUM SERPL-MCNC: 9.3 MG/DL (ref 8.6–10)
CHLORIDE SERPL-SCNC: 104 MMOL/L (ref 98–107)
CHOLEST SERPL-MCNC: 162 MG/DL
CREAT SERPL-MCNC: 0.54 MG/DL (ref 0.51–0.95)
DEPRECATED HCO3 PLAS-SCNC: 22 MMOL/L (ref 22–29)
ERYTHROCYTE [DISTWIDTH] IN BLOOD BY AUTOMATED COUNT: 13.4 % (ref 10–15)
FERRITIN SERPL-MCNC: 56 NG/ML (ref 6–175)
GFR SERPL CREATININE-BSD FRML MDRD: >90 ML/MIN/1.73M2
GLUCOSE SERPL-MCNC: 89 MG/DL (ref 70–99)
HCT VFR BLD AUTO: 41.1 % (ref 35–47)
HDLC SERPL-MCNC: 48 MG/DL
HGB BLD-MCNC: 13.5 G/DL (ref 11.7–15.7)
LDLC SERPL CALC-MCNC: 96 MG/DL
MCH RBC QN AUTO: 28.6 PG (ref 26.5–33)
MCHC RBC AUTO-ENTMCNC: 32.8 G/DL (ref 31.5–36.5)
MCV RBC AUTO: 87 FL (ref 78–100)
NONHDLC SERPL-MCNC: 114 MG/DL
PLATELET # BLD AUTO: 191 10E3/UL (ref 150–450)
POTASSIUM SERPL-SCNC: 4.2 MMOL/L (ref 3.4–5.3)
PROT SERPL-MCNC: 8.3 G/DL (ref 6.4–8.3)
RBC # BLD AUTO: 4.72 10E6/UL (ref 3.8–5.2)
SODIUM SERPL-SCNC: 137 MMOL/L (ref 136–145)
TRIGL SERPL-MCNC: 88 MG/DL
VIT B12 SERPL-MCNC: 733 PG/ML (ref 232–1245)
WBC # BLD AUTO: 3.9 10E3/UL (ref 4–11)

## 2022-11-25 PROCEDURE — 80053 COMPREHEN METABOLIC PANEL: CPT | Performed by: INTERNAL MEDICINE

## 2022-11-25 PROCEDURE — 82728 ASSAY OF FERRITIN: CPT | Performed by: INTERNAL MEDICINE

## 2022-11-25 PROCEDURE — 80061 LIPID PANEL: CPT | Performed by: INTERNAL MEDICINE

## 2022-11-25 PROCEDURE — 82306 VITAMIN D 25 HYDROXY: CPT | Performed by: INTERNAL MEDICINE

## 2022-11-25 PROCEDURE — 36415 COLL VENOUS BLD VENIPUNCTURE: CPT | Performed by: INTERNAL MEDICINE

## 2022-11-25 PROCEDURE — 85027 COMPLETE CBC AUTOMATED: CPT | Performed by: INTERNAL MEDICINE

## 2022-11-25 PROCEDURE — 82607 VITAMIN B-12: CPT | Performed by: INTERNAL MEDICINE

## 2022-11-25 PROCEDURE — 99395 PREV VISIT EST AGE 18-39: CPT | Performed by: INTERNAL MEDICINE

## 2022-11-25 ASSESSMENT — ENCOUNTER SYMPTOMS
DIARRHEA: 0
HEADACHES: 0
PARESTHESIAS: 0
HEMATURIA: 0
MYALGIAS: 0
FEVER: 0
NAUSEA: 0
EYE PAIN: 0
HEMATOCHEZIA: 0
JOINT SWELLING: 0
COUGH: 0
FREQUENCY: 0
PALPITATIONS: 0
ARTHRALGIAS: 0
CHILLS: 0
DYSURIA: 0
CONSTIPATION: 0
DIZZINESS: 0
ABDOMINAL PAIN: 0
WEAKNESS: 0
NERVOUS/ANXIOUS: 0
SHORTNESS OF BREATH: 0
SORE THROAT: 0
HEARTBURN: 0

## 2022-11-25 ASSESSMENT — PAIN SCALES - GENERAL: PAINLEVEL: NO PAIN (0)

## 2022-11-25 NOTE — PROGRESS NOTES
Dr Soria's note    Patient's instructions / PLAN:                                                        Plan:  1.  Labs today - [address]   2. Ortho referral for the Right wrist      ASSESSMENT & PLAN:                                                      (Z00.00) Routine general medical examination at a health care facility  (primary encounter diagnosis)  Comment:   Plan: Ferritin, CBC with platelets, Lipid panel         reflex to direct LDL Fasting, Comprehensive         metabolic panel, Vitamin D Deficiency, Vitamin         B12, CANCELED: TSH with free T4 reflex            (L65.9) Hair loss  Comment:   Plan: Ferritin, CBC with platelets, Lipid panel         reflex to direct LDL Fasting, Comprehensive         metabolic panel, Vitamin D Deficiency, Vitamin         B12, CANCELED: TSH with free T4 reflex            (M67.431) Ganglion cyst of wrist, right  Comment:   Plan: Orthopedic  Referral               Chief Complaint:                                                        Annual exam  Follow up chronic medical problems      SUBJECTIVE:                                                    History of present illness     We reviewed the chronic medical problems as above.   I reviewed the recent tests results in Epic     Hair loss  -- exam - she has new hair coming out, 1/2 - 1 inch  -- severe stress in April ( lost pregnancy)    R wrist ganglion cyst on the dorsal aspect x 2-3 m    CareEverywhere/Patient reports:  -- pap smear done on Sept, 2021 with [facility]. It was normal . Info sent to [facility]     Hypothyroidism   -- f/u with endo    ROS:     See below      PMHx: - reviewed  Past Medical History:   Diagnosis Date     Diabetes (H)     gestational diabetes, diet controlled     Thyroid disease     hypothyroidism       PSHx: reviewed  Past Surgical History:   Procedure Laterality Date     ENT SURGERY      nasal surgery 2018        Soc Hx: No daily alcohol, no smoking  Social History      Socioeconomic History     Marital status:      Spouse name: Not on file     Number of children: Not on file     Years of education: Not on file     Highest education level: Not on file   Occupational History     Not on file   Tobacco Use     Smoking status: Never     Passive exposure: Never     Smokeless tobacco: Never   Vaping Use     Vaping Use: Never used   Substance and Sexual Activity     Alcohol use: Not Currently     Drug use: Never     Sexual activity: Not Currently   Other Topics Concern     Not on file   Social History Narrative     Not on file     Social Determinants of Health     Financial Resource Strain: Not on file   Food Insecurity: Not on file   Transportation Needs: Not on file   Physical Activity: Not on file   Stress: Not on file   Social Connections: Not on file   Intimate Partner Violence: Not on file   Housing Stability: Not on file        Fam Hx: reviewed  No family history on file.      Screening: reviewed      All: reviewed    Meds: reviewed  Current Outpatient Medications   Medication Sig Dispense Refill     acetaminophen (TYLENOL) 325 MG tablet Take 2 tablets (650 mg) by mouth every 6 hours as needed for mild pain or fever (greater than or equal to 38  C /100.4  F (oral) or 38.5  C/ 101.4  F (core).)       levothyroxine (SYNTHROID/LEVOTHROID) 88 MCG tablet Take 8 tablets/week, I.e. 1 tablet per day, but 1 day per week take 2 tablets 100 tablet 4       OBJECTIVE:                                                    Physical Exam :  Blood pressure 121/84, pulse 80, temperature 98.1  F (36.7  C), temperature source Tympanic, resp. rate 18, height 1.524 m (5'), weight 49 kg (108 lb), SpO2 100 %.     NAD, appears comfortable  Skin clear, no rashes  Neck: supple, no JVD,  no thyroidmegaly  Lymph nodes non palpable in the cervical, supraclavicular axillaries,   Chest: clear to auscultation with good respiratory effort  Cardiac: S1S2, RRR, no mgr appreciated  Abdomen: soft, not tender,  not distended, audible bowel sound, no hepatosplenomegaly, no palpable masses, no abdominal bruits  Extremities: no cyanosis, clubbing or edema.   Neuro: A, Ox3, no focal signs.  Breast exam in supine and erect position: they are symmetrical, no skin changes, no tenderness or nodes on palpation. Nipples are erect, no skin lesions, no discharge on pressure.    Pelvic exam: deferred, no symptoms, no hx of abnormal pap         Lulu Soria MD  Internal Medicine          SUBJECTIVE:   CC: Neetu is an 32 year old who presents for preventive health visit.   Patient has been advised of split billing requirements and indicates understanding: Yes  Healthy Habits:     Getting at least 3 servings of Calcium per day:  NO    Bi-annual eye exam:  NO    Dental care twice a year:  NO    Sleep apnea or symptoms of sleep apnea:  None    Diet:  Regular (no restrictions)    Frequency of exercise:  2-3 days/week    Duration of exercise:  15-30 minutes    Taking medications regularly:  Yes    Medication side effects:  None    PHQ-2 Total Score: 0    Additional concerns today:  No              Today's PHQ-2 Score:   PHQ-2 ( 1999 Pfizer) 7/28/2022   Q1: Little interest or pleasure in doing things 0   Q2: Feeling down, depressed or hopeless 0   PHQ-2 Score 0       Have you ever done Advance Care Planning? (For example, a Health Directive, POLST, or a discussion with a medical provider or your loved ones about your wishes): No, advance care planning information given to patient to review.  Patient declined advance care planning discussion at this time.    Social History     Tobacco Use     Smoking status: Never     Smokeless tobacco: Never   Substance Use Topics     Alcohol use: Not Currently         No flowsheet data found.    Reviewed orders with patient.  Reviewed health maintenance and updated orders accordingly - Yes  Labs reviewed in EPIC    Breast Cancer Screening:  Any new diagnosis of family breast, ovarian, or bowel cancer?  No    FHS-7: No flowsheet data found.      Pertinent mammograms are reviewed under the imaging tab.    History of abnormal Pap smear:      Reviewed and updated as needed this visit by clinical staff                  Reviewed and updated as needed this visit by Provider                     Review of Systems   Constitutional: Negative for chills and fever.   HENT: Negative for congestion, hearing loss and sore throat.    Eyes: Negative for pain and visual disturbance.   Respiratory: Negative for cough and shortness of breath.    Cardiovascular: Negative for chest pain, palpitations and peripheral edema.   Gastrointestinal: Negative for abdominal pain, constipation, diarrhea, heartburn, hematochezia and nausea.   Genitourinary: Negative for dysuria, frequency, genital sores, hematuria and urgency.   Musculoskeletal: Negative for arthralgias, joint swelling and myalgias.   Skin: Negative for rash.   Neurological: Negative for dizziness, weakness, headaches and paresthesias.   Psychiatric/Behavioral: Negative for mood changes. The patient is not nervous/anxious.      Patient has been advised of split billing requirements and indicates understanding: Yes At the check in, at the        COUNSELING:  Reviewed preventive health counseling, as reflected in patient instructions       Regular exercise       Healthy diet/nutrition      BMI:   Estimated body mass index is 25.39 kg/m  as calculated from the following:    Height as of 4/11/22: 1.524 m (5').    Weight as of 4/11/22: 59 kg (130 lb).         She reports that she has never smoked. She has never used smokeless tobacco.      {Counseling Resources  [place] Preventive Services Task Force  Cholesterol Screening  Health diet/nutrition  Pooled Cohorts Equation Calculator  USDA's MyPlate  ASA Prophylaxis  Lung CA Screening  Osteoporosis prevention/bone health :416357}    Lulu Shine MD  [facility]

## 2022-11-25 NOTE — Clinical Note
CareEverywhere/Patient reports: -- pap smear done on Sept, 2021 with Claritza [facility]. It was normal . Info sent to [facility]

## 2022-11-27 LAB — DEPRECATED CALCIDIOL+CALCIFEROL SERPL-MC: 33 UG/L (ref 20–75)

## 2023-12-20 ENCOUNTER — TELEPHONE (OUTPATIENT)
Dept: ENDOCRINOLOGY | Facility: CLINIC | Age: 33
End: 2023-12-20
Payer: COMMERCIAL

## 2023-12-20 DIAGNOSIS — E06.3 HYPOTHYROIDISM DUE TO HASHIMOTO'S THYROIDITIS: ICD-10-CM

## 2023-12-20 NOTE — TELEPHONE ENCOUNTER
"Pt needs labs now.       Requested Prescriptions   Pending Prescriptions Disp Refills    levothyroxine (SYNTHROID/LEVOTHROID) 88 MCG tablet [Pharmacy Med Name: LEVOTHYROXINE 88 MCG TABLET] 100 tablet 4     Sig: TAKE 1 TABLET BY MOUTH DAILY FOR 6 DAYS/WEEK AND TAKE 2 TAB ON 1 DAY PER WEEK       Thyroid Protocol Failed - 12/20/2023 12:13 AM        Failed - Recent (12 mo) or future (30 days) visit within the authorizing provider's specialty     Patient has had an office visit with the authorizing provider or a provider within the authorizing providers department within the previous 12 mos or has a future within next 30 days. See \"Patient Info\" tab in inbasket, or \"Choose Columns\" in Meds & Orders section of the refill encounter.              Failed - Normal TSH on file in past 12 months     Recent Labs   Lab Test 10/24/22  1458   TSH 4.51*              Passed - Patient is 12 years or older        Passed - Medication is active on med list        Passed - No active pregnancy on record     If patient is pregnant or has had a positive pregnancy test, please check TSH.          Passed - No positive pregnancy test in past 12 months     If patient is pregnant or has had a positive pregnancy test, please check TSH.               "

## 2023-12-22 RX ORDER — LEVOTHYROXINE SODIUM 88 UG/1
TABLET ORAL
Qty: 100 TABLET | Refills: 4 | OUTPATIENT
Start: 2023-12-22

## 2024-02-09 ENCOUNTER — LAB (OUTPATIENT)
Dept: LAB | Facility: CLINIC | Age: 34
End: 2024-02-09
Payer: COMMERCIAL

## 2024-02-09 DIAGNOSIS — E06.3 HYPOTHYROIDISM DUE TO HASHIMOTO'S THYROIDITIS: ICD-10-CM

## 2024-02-09 LAB — TSH SERPL DL<=0.005 MIU/L-ACNC: 13.2 UIU/ML (ref 0.3–4.2)

## 2024-02-09 PROCEDURE — 84443 ASSAY THYROID STIM HORMONE: CPT

## 2024-02-09 PROCEDURE — 36415 COLL VENOUS BLD VENIPUNCTURE: CPT

## 2024-02-09 PROCEDURE — 84439 ASSAY OF FREE THYROXINE: CPT

## 2024-02-10 ENCOUNTER — MYC REFILL (OUTPATIENT)
Dept: ENDOCRINOLOGY | Facility: CLINIC | Age: 34
End: 2024-02-10
Payer: COMMERCIAL

## 2024-02-10 DIAGNOSIS — E06.3 HYPOTHYROIDISM DUE TO HASHIMOTO'S THYROIDITIS: ICD-10-CM

## 2024-02-10 LAB — T4 FREE SERPL-MCNC: 1.36 NG/DL (ref 0.9–1.7)

## 2024-02-12 ENCOUNTER — NURSE TRIAGE (OUTPATIENT)
Dept: NURSING | Facility: CLINIC | Age: 34
End: 2024-02-12
Payer: COMMERCIAL

## 2024-02-12 ENCOUNTER — APPOINTMENT (OUTPATIENT)
Dept: ULTRASOUND IMAGING | Facility: CLINIC | Age: 34
End: 2024-02-12
Attending: EMERGENCY MEDICINE
Payer: COMMERCIAL

## 2024-02-12 DIAGNOSIS — E06.3 HYPOTHYROIDISM DUE TO HASHIMOTO'S THYROIDITIS: Primary | ICD-10-CM

## 2024-02-12 DIAGNOSIS — E06.3 HYPOTHYROIDISM DUE TO HASHIMOTO'S THYROIDITIS: ICD-10-CM

## 2024-02-12 LAB
ABO/RH(D): NORMAL
ALBUMIN UR-MCNC: NEGATIVE MG/DL
ANION GAP SERPL CALCULATED.3IONS-SCNC: 17 MMOL/L (ref 7–15)
ANTIBODY SCREEN: NEGATIVE
APPEARANCE UR: CLEAR
BACTERIA #/AREA URNS HPF: ABNORMAL /HPF
BASOPHILS # BLD AUTO: 0 10E3/UL (ref 0–0.2)
BASOPHILS NFR BLD AUTO: 1 %
BILIRUB UR QL STRIP: NEGATIVE
BUN SERPL-MCNC: 9.4 MG/DL (ref 6–20)
CALCIUM SERPL-MCNC: 10.1 MG/DL (ref 8.6–10)
CHLORIDE SERPL-SCNC: 103 MMOL/L (ref 98–107)
COLOR UR AUTO: ABNORMAL
CREAT SERPL-MCNC: 0.52 MG/DL (ref 0.51–0.95)
DEPRECATED HCO3 PLAS-SCNC: 17 MMOL/L (ref 22–29)
EGFRCR SERPLBLD CKD-EPI 2021: >90 ML/MIN/1.73M2
EOSINOPHIL # BLD AUTO: 0 10E3/UL (ref 0–0.7)
EOSINOPHIL NFR BLD AUTO: 1 %
ERYTHROCYTE [DISTWIDTH] IN BLOOD BY AUTOMATED COUNT: 13.1 % (ref 10–15)
GLUCOSE SERPL-MCNC: 118 MG/DL (ref 70–99)
GLUCOSE UR STRIP-MCNC: NEGATIVE MG/DL
HCG INTACT+B SERPL-ACNC: ABNORMAL MIU/ML
HCT VFR BLD AUTO: 38.2 % (ref 35–47)
HGB BLD-MCNC: 12.6 G/DL (ref 11.7–15.7)
HGB UR QL STRIP: NEGATIVE
HOLD SPECIMEN: NORMAL
HOLD SPECIMEN: NORMAL
IMM GRANULOCYTES # BLD: 0 10E3/UL
IMM GRANULOCYTES NFR BLD: 0 %
KETONES UR STRIP-MCNC: NEGATIVE MG/DL
LEUKOCYTE ESTERASE UR QL STRIP: NEGATIVE
LYMPHOCYTES # BLD AUTO: 1.3 10E3/UL (ref 0.8–5.3)
LYMPHOCYTES NFR BLD AUTO: 29 %
MCH RBC QN AUTO: 29 PG (ref 26.5–33)
MCHC RBC AUTO-ENTMCNC: 33 G/DL (ref 31.5–36.5)
MCV RBC AUTO: 88 FL (ref 78–100)
MONOCYTES # BLD AUTO: 0.5 10E3/UL (ref 0–1.3)
MONOCYTES NFR BLD AUTO: 11 %
NEUTROPHILS # BLD AUTO: 2.6 10E3/UL (ref 1.6–8.3)
NEUTROPHILS NFR BLD AUTO: 58 %
NITRATE UR QL: NEGATIVE
NRBC # BLD AUTO: 0 10E3/UL
NRBC BLD AUTO-RTO: 0 /100
PH UR STRIP: 7 [PH] (ref 5–7)
PLATELET # BLD AUTO: 172 10E3/UL (ref 150–450)
POTASSIUM SERPL-SCNC: 4 MMOL/L (ref 3.4–5.3)
RBC # BLD AUTO: 4.35 10E6/UL (ref 3.8–5.2)
RBC URINE: 0 /HPF
SODIUM SERPL-SCNC: 137 MMOL/L (ref 135–145)
SP GR UR STRIP: 1 (ref 1–1.03)
SPECIMEN EXPIRATION DATE: NORMAL
SQUAMOUS EPITHELIAL: 1 /HPF
UROBILINOGEN UR STRIP-MCNC: NORMAL MG/DL
WBC # BLD AUTO: 4.4 10E3/UL (ref 4–11)
WBC URINE: <1 /HPF

## 2024-02-12 PROCEDURE — 99284 EMERGENCY DEPT VISIT MOD MDM: CPT | Mod: 25

## 2024-02-12 PROCEDURE — 87086 URINE CULTURE/COLONY COUNT: CPT | Performed by: EMERGENCY MEDICINE

## 2024-02-12 PROCEDURE — 85025 COMPLETE CBC W/AUTO DIFF WBC: CPT | Performed by: EMERGENCY MEDICINE

## 2024-02-12 PROCEDURE — 81001 URINALYSIS AUTO W/SCOPE: CPT | Performed by: EMERGENCY MEDICINE

## 2024-02-12 PROCEDURE — 84702 CHORIONIC GONADOTROPIN TEST: CPT | Performed by: EMERGENCY MEDICINE

## 2024-02-12 PROCEDURE — 80048 BASIC METABOLIC PNL TOTAL CA: CPT | Performed by: EMERGENCY MEDICINE

## 2024-02-12 PROCEDURE — 36415 COLL VENOUS BLD VENIPUNCTURE: CPT | Performed by: EMERGENCY MEDICINE

## 2024-02-12 PROCEDURE — 76801 OB US < 14 WKS SINGLE FETUS: CPT

## 2024-02-12 RX ORDER — LEVOTHYROXINE SODIUM 88 UG/1
TABLET ORAL
Qty: 40 TABLET | Refills: 0 | OUTPATIENT
Start: 2024-02-12

## 2024-02-12 RX ORDER — LEVOTHYROXINE SODIUM 88 UG/1
TABLET ORAL
Qty: 108 TABLET | Refills: 1 | Status: SHIPPED | OUTPATIENT
Start: 2024-02-12

## 2024-02-13 ENCOUNTER — HOSPITAL ENCOUNTER (EMERGENCY)
Facility: CLINIC | Age: 34
Discharge: HOME | End: 2024-02-13
Attending: EMERGENCY MEDICINE | Admitting: EMERGENCY MEDICINE
Payer: COMMERCIAL

## 2024-02-13 VITALS
WEIGHT: 107.14 LBS | OXYGEN SATURATION: 94 % | HEIGHT: 55 IN | RESPIRATION RATE: 18 BRPM | HEART RATE: 127 BPM | SYSTOLIC BLOOD PRESSURE: 133 MMHG | DIASTOLIC BLOOD PRESSURE: 86 MMHG | TEMPERATURE: 99.1 F | BODY MASS INDEX: 24.8 KG/M2

## 2024-02-13 DIAGNOSIS — O20.9 VAGINAL BLEEDING IN PREGNANCY, FIRST TRIMESTER: ICD-10-CM

## 2024-02-13 DIAGNOSIS — R82.71 ASYMPTOMATIC BACTERIURIA: ICD-10-CM

## 2024-02-13 PROCEDURE — 86900 BLOOD TYPING SEROLOGIC ABO: CPT | Performed by: EMERGENCY MEDICINE

## 2024-02-13 PROCEDURE — 36415 COLL VENOUS BLD VENIPUNCTURE: CPT | Performed by: EMERGENCY MEDICINE

## 2024-02-13 PROCEDURE — 250N000013 HC RX MED GY IP 250 OP 250 PS 637: Performed by: EMERGENCY MEDICINE

## 2024-02-13 RX ORDER — CEPHALEXIN 500 MG/1
500 CAPSULE ORAL 2 TIMES DAILY
Qty: 14 CAPSULE | Refills: 0 | Status: SHIPPED | OUTPATIENT
Start: 2024-02-13 | End: 2024-02-20

## 2024-02-13 RX ORDER — CEPHALEXIN 500 MG/1
500 CAPSULE ORAL ONCE
Status: COMPLETED | OUTPATIENT
Start: 2024-02-13 | End: 2024-02-13

## 2024-02-13 RX ADMIN — CEPHALEXIN 500 MG: 500 CAPSULE ORAL at 02:27

## 2024-02-13 NOTE — DISCHARGE INSTRUCTIONS
Please follow-up with your OB/GYN tomorrow as previously scheduled for reevaluation and discussion of repeat ultrasound and repeat pregnancy hormone testing.      Discharge Instructions  Vaginal Bleeding in Pregnancy    Bleeding in early pregnancy can be a sign of a miscarriage or an abnormal pregnancy, but often is innocent and the pregnancy will continue normally. We may do blood pregnancy tests and ultrasound to try to determine what is causing the bleeding, but sometimes we are unable to tell. If this is the case, it will often require more time, more blood tests, and another ultrasound.     Generally, every Emergency Department visit should have a follow-up clinic visit with either a primary or a specialty clinic/provider. Please follow-up as instructed by your emergency provider today.    Return to the Emergency Department if:  You have severe abdominal (belly) or pelvic pain.  You faint, or feel lightheaded or dizzy.   Your bleeding gets much worse.  You pass any tissue--solid material that does not look like a blood clot. If you pass tissue, save it (even if you have to pull it out of the toilet) and put it in a plastic bag or jar and bring it in.    You have a fever of 100.5 F or higher.  If no pregnancy could be seen:  It may be that everything is normal and it is just too early to see the pregnancy. It is also possible that you could have an ectopic pregnancy, which is a pregnancy in an abnormal location, such as in the tube ( tubal pregnancy ). We don t see evidence that this is likely today but we cannot exclude it with certainty until a pregnancy can be seen in the uterus (womb) and an ectopic pregnancy can cause severe internal bleeding or death so follow-up is very important.  You should not be alone, in case you suddenly become very sick.   You should not have sex or put anything in your vagina.     If a pregnancy was seen in your uterus:  If a heartbeat could be seen, the chance of miscarriage is  lower but because you had bleeding the chance of a miscarriage still exists.  You should not have sex or put anything in your vagina.  Follow-up as directed with your OB/GYN provider.     Facts about miscarriage: We hope you do not have a miscarriage, but if you do, here are important things to know:  Early miscarriage is very common, and having one miscarriage does not mean you will have problems with another pregnancy.  Nothing you did caused it. Taking medicine, drinking alcohol, having sex, exercising, or falling down will not cause a miscarriage.     If you were given a prescription for medicine here today, be sure to read all of the information (including the package insert) that comes with your prescription.  This will include important information about the medicine, its side effects, and any warnings that you need to know about.  The pharmacist who fills the prescription can provide more information and answer questions you may have about the medicine.  If you have questions or concerns that the pharmacist cannot address, please call or return to the Emergency Department.   Remember that you can always come back to the Emergency Department if you are not able to see your regular provider in the amount of time listed above, if you get any new symptoms, or if there is anything that worries you.

## 2024-02-13 NOTE — ED TRIAGE NOTES
Pt arrives with , is 6 wks pregnant, around 1930 pt noticed some pink discharge when wiping. Denies abdominal cramping. Pt reports she is very nervous and causing high heart rate.

## 2024-02-13 NOTE — ED PROVIDER NOTES
"  History     Chief Complaint:  Vaginal Bleeding - Pregnant     The history is provided by the patient and the spouse.      Neetu De Anda is a 33 year old female who is 6 weeks pregnant with a history of diabetes and thyroid disease presenting with light vaginal bleeding. She noted some pink discharge when wiping around 1900 when she went to the bathroom. No pain or cramping present at the time of symptom onset. Denies fever, chills, shortness of breath, or lightheadedness. LMP was 12/29/24. Patient has an appointment scheduled at her OB tomorrow (2/14/24). Of note, patient experienced another pregnancy in 2022 that resulted in full term loss.     Independent Historian:       Review of External Notes:   See MDM.     Medications:    Levothyroxine     Past Medical History:    Diabetes   Thyroid disease    Past Surgical History:    Nasal surgery      Physical Exam   Patient Vitals for the past 24 hrs:   BP Temp Temp src Pulse Resp SpO2 Height Weight   02/12/24 2149 (!) 159/99 99.1  F (37.3  C) Temporal (!) 124 20 100 % 1.397 m (4' 7\") 48.6 kg (107 lb 2.3 oz)      Physical Exam  Constitutional: Well developed, nontox appearance  Head: Atraumatic.   Neck:  no stridor  Eyes: no scleral icterus  Cardiovascular: RRR, 2+ bilat radial pulses  Pulmonary/Chest: nml resp effort  Abdominal: ND, soft, NT, no rebound or guarding   Ext: Warm, well perfused, no edema  Neurological: A&O, symmetric facies, moves ext x4  Skin: Skin is warm and dry.   Psychiatric: Anxious  Nursing note and vitals reviewed.    Emergency Department Course     Imaging:  OB  US 1st trimester w transvag   Final Result   IMPRESSION:    Gestational sac within the uterus containing a yolk sac but no definite fetal pole. Clinical correlation with patient's quantitative beta hCG recommended. No definite subchorionic hemorrhage seen.       Differential considerations include early intrauterine pregnancy and blighted ovum.      Complex cystic foci in the right " ovary as described. No evidence for right ovarian torsion. Short-term follow-up examination recommended to evaluate for interval clearing in approximately 6-8 weeks.      No evidence for ovarian torsion.      No pelvic free fluid.         Laboratory:  Labs Ordered and Resulted from Time of ED Arrival to Time of ED Departure   ROUTINE UA WITH MICROSCOPIC REFLEX TO CULTURE - Abnormal       Result Value    Color Urine Straw      Appearance Urine Clear      Glucose Urine Negative      Bilirubin Urine Negative      Ketones Urine Negative      Specific Gravity Urine 1.003      Blood Urine Negative      pH Urine 7.0      Protein Albumin Urine Negative      Urobilinogen Urine Normal      Nitrite Urine Negative      Leukocyte Esterase Urine Negative      Bacteria Urine Few (*)     RBC Urine 0      WBC Urine <1      Squamous Epithelials Urine 1     HCG QUANTITATIVE PREGNANCY - Abnormal    hCG Quantitative 16,018 (*)    BASIC METABOLIC PANEL - Abnormal    Sodium 137      Potassium 4.0      Chloride 103      Carbon Dioxide (CO2) 17 (*)     Anion Gap 17 (*)     Urea Nitrogen 9.4      Creatinine 0.52      GFR Estimate >90      Calcium 10.1 (*)     Glucose 118 (*)    CBC WITH PLATELETS AND DIFFERENTIAL    WBC Count 4.4      RBC Count 4.35      Hemoglobin 12.6      Hematocrit 38.2      MCV 88      MCH 29.0      MCHC 33.0      RDW 13.1      Platelet Count 172      % Neutrophils 58      % Lymphocytes 29      % Monocytes 11      % Eosinophils 1      % Basophils 1      % Immature Granulocytes 0      NRBCs per 100 WBC 0      Absolute Neutrophils 2.6      Absolute Lymphocytes 1.3      Absolute Monocytes 0.5      Absolute Eosinophils 0.0      Absolute Basophils 0.0      Absolute Immature Granulocytes 0.0      Absolute NRBCs 0.0     TYPE AND SCREEN, ADULT    ABO/RH(D) B POS      Antibody Screen Negative      SPECIMEN EXPIRATION DATE [id number]     ABO/RH TYPE AND SCREEN      Emergency Department Course &  Assessments:    Interventions:  Medications   cephALEXin (KEFLEX) capsule 500 mg (has no administration in time range)      Independent Interpretation (X-rays, CTs, rhythm strip):  None    Assessments/Consultations/Discussion of Management or Tests:  ED Course as of 24 I obtained the history and examined the patient as noted above.          Social Determinants of Health affecting care:   See MDM.     Disposition:  The patient was discharged to home.     Impression & Plan      Medical Decision Makin year old female presenting w/ vaginal bleeding in pregnancy     Social determinants affecting patient's health include: No significant social determinants negatively affecting the patient's health     I reviewed medical records from nurse triage telephone note from today, 2024, OB/GYN admission on 2022     DDx includes pregnancy, ectopic pregnancy, menorrhagia, metrorrhagia, anemia, subchorionic hemorrhage.   Labs significant for Rh+, elevated beta-hCG, hemoglobin normal.  Imaging sig for gestational sac with yolk sac intrauterine consistent with an IUP, incidental findings as noted above without evidence of ovarian torsion.  Patient reports sensation of vaginal spotting.  She has an appointment for follow-up on 2024 and she was encouraged to keep this appointment at which point she may have a repeat beta-hCG for further evaluation.  Recommendations given regarding follow-up and ED for worsening symptoms.  Given bacteria noted on urinalysis, first dose of Keflex given the emergency department with prescription provided as noted below.  At this time I feel the pt is safe for discharge.  Recommendations given regarding follow up with PCP, OBGYN and return to the emergency department as needed for new or worsening symptoms.  Pt counseled on all results, disposition and diagnosis.  They are understanding and agreeable to plan. Patient discharged in stable condition.       Diagnosis:    ICD-10-CM    1. Vaginal bleeding in pregnancy, first trimester  O20.9       2. Asymptomatic bacteriuria  R82.71          Discharge Medications:  New Prescriptions    No medications on file      Scribe Disclosure:  I, Kesha Lord, am serving as a scribe at 2:04 AM on 2/13/2024 to document services personally performed by Alvin Garrett MD based on my observations and the provider's statements to me.  2/13/2024   Alvin Garrett MD Vaughn, Christopher E, MD  02/13/24 0708

## 2024-02-13 NOTE — TELEPHONE ENCOUNTER
Nurse Triage SBAR    Is this a 2nd Level Triage? NO    Situation: Vaginal bleeding during pregnancy.    Background: Patient reports she is 6 weeks pregnant and is having pink vaginal discharge.    Assessment: Reports discharge started this evening. Denies any abdominal pain, but reports hip pain. Denies any pain or burning with urination, and any vaginal itching. Reports she noticed gray-whitish tissue with the vaginal discharge.     Protocol Recommended Disposition:   Go to ED Now    Recommendation: Recommendation is to go to ED now. Assisted in finding ER close to home. Reviewed care advice and call back instructions. Patient plans to follow advice.          Does the patient meet one of the following criteria for ADS visit consideration? 16+ years old, with an [facility] PCP     TIP  Providers, please consider if this condition is appropriate for management at one of our Acute and Diagnostic Services sites.     If patient is a good candidate, please use dotphrase <dot>triageresponse and select Refer to ADS to document.      Reason for Disposition   Passed tissue (e.g., gray-white)    Additional Information   Negative: Followed a genital area injury (e.g., vagina, vulva)   Negative: Symptoms after a sexual assault  (Note: AFTER using this guideline to treat symptoms, go to guideline SEXUAL ASSAULT OR RAPE)   Negative: Pain or burning with passing urine (urination) is main symptom   Negative: Foreign body in vagina (e.g., tampon)   Negative: [1] Pregnant 20 or more weeks  (5 months or more) AND [2] contractions   Negative: Pregnant   Negative: Shock suspected (e.g., cold/pale/clammy skin, too weak to stand, low BP, rapid pulse)   Negative: Difficult to awaken or acting confused (e.g., disoriented, slurred speech)   Negative: Passed out (i.e., lost consciousness, collapsed and was not responding)   Negative: Sounds like a life-threatening emergency to the triager   Negative: [1] Vaginal bleeding AND [2] pregnant 20 or  more weeks   Negative: Not pregnant or pregnancy status unknown   Negative: SEVERE abdominal pain   Negative: SEVERE vaginal bleeding (e.g., soaking 2 pads per hour or large blood clots and present 2 or more hours)   Negative: SEVERE dizziness (e.g., unable to stand, requires support to walk, feels like passing out)   Negative: [1] MODERATE vaginal bleeding (e.g., soaking 1 pad per hour; clots) AND [2] present > 6 hours   Negative: [1] MODERATE vaginal bleeding (e.g., soaking 1 pad per hour; clots) AND [2] pregnant > 12 weeks    Protocols used: Vaginal [id number]-IC, Pregnancy - Vaginal Bleeding Less Than 20 Weeks EGA-A-AH

## 2024-02-14 ENCOUNTER — TELEPHONE (OUTPATIENT)
Dept: EMERGENCY MEDICINE | Facility: CLINIC | Age: 34
End: 2024-02-14
Payer: COMMERCIAL

## 2024-02-14 LAB — BACTERIA UR CULT: NORMAL

## 2024-02-14 NOTE — TELEPHONE ENCOUNTER
[facility]    Reason for call: Lab Result Notification     Lab Result (including Rx patient on, if applicable).  If culture, copy of lab report at bottom.  Lab Result: Final urine culture report is negative.     Adult: Negative urine culture parameters per protocol: Any # urogenital geoff, single or mixed     [facility] Emergency Dept discharge antibiotic prescribed (If applicable): Cephalexin (Keflex) 500 mg capsule, 1 capsule (500 mg) by mouth 2 times daily for 7 days.        Treatment recommendations per [facility] ED Lab Result Urine Culture protocol:  UA neg and pt asymptomatic.  Call to assess symptoms to see if to continue abx or discontinue.  Had OB appt today.    Creatinine Level (mg/dl)   Creatinine   Date Value Ref Range Status   02/12/2024 0.52 0.51 - 0.95 mg/dL Final    Creatinine clearance (ml/min), if applicable    Serum creatinine: 0.52 mg/dL 02/12/24 2206  Estimated creatinine clearance: 120.3 mL/min     Patient's current Symptoms:   Pt denies any Uti symptoms, advised to discontinue ABX     RN Recommendations/Instructions per [facility] ED lab result protocol:   [facility] ED lab result protocol utilized: Urine Cx  Advise to discontinue current antibiotic.     Patient/care giver notified to contact your PCP clinic or return to the Emergency department if your:  Symptoms return.  Symptoms do not improve after 3 days on antibiotic.  Symptoms do not resolve after completing antibiotic.  Symptoms worsen or other concerning symptoms.    Salbador Nicholson RN

## 2024-06-12 ENCOUNTER — TELEPHONE (OUTPATIENT)
Dept: ENDOCRINOLOGY | Facility: CLINIC | Age: 34
End: 2024-06-12
Payer: COMMERCIAL

## 2024-06-12 DIAGNOSIS — E06.3 HYPOTHYROIDISM DUE TO HASHIMOTO'S THYROIDITIS: ICD-10-CM

## 2024-06-12 RX ORDER — LEVOTHYROXINE SODIUM 88 UG/1
TABLET ORAL
Qty: 100 TABLET | Refills: 4 | OUTPATIENT
Start: 2024-06-12

## 2024-06-12 NOTE — TELEPHONE ENCOUNTER
PT needs labs now      Requested Prescriptions   Pending Prescriptions Disp Refills    levothyroxine (SYNTHROID/LEVOTHROID) 88 MCG tablet [Pharmacy Med Name: LEVOTHYROXINE 88 MCG TABLET] 100 tablet 4     Sig: TAKE 1 TABLET BY MOUTH DAILY FOR 6 DAYS/WEEK AND TAKE 2 TAB ON 1 DAY PER WEEK       Thyroid Protocol Failed - 6/12/2024  3:35 PM        Failed - Recent (12 mo) or future (30 days) visit within the authorizing provider's specialty     The patient must have completed an in-person or virtual visit within the past 12 months or has a future visit scheduled within the next 90 days with the authorizing provider s specialty.  Urgent care and e-visits do not quality as an office visit for this protocol.          Failed - Normal TSH on file in past 12 months     Recent Labs   Lab Test 02/09/24  1309   TSH 13.20*              Failed - No active pregnancy on record     If patient is pregnant or has had a positive pregnancy test, please check TSH.          Passed - Patient is 12 years or older        Passed - Medication is active on med list        Passed - Medication indicated for associated diagnosis     Medication is associated with one or more of the following diagnoses:  Hypothyroidism  Thyroid stimulating hormone suppression therapy  Thyroid cancer          Passed - No positive pregnancy test in past 12 months     If patient is pregnant or has had a positive pregnancy test, please check TSH.

## 2024-06-12 NOTE — TELEPHONE ENCOUNTER
Spoke w/patient. She is expecting and her Obgyn with [facility] is managing her thyroid and her medications at the moment.   She is still planning to come in to see Dr. Anders in July for a follow up.

## 2024-12-14 ENCOUNTER — HEALTH MAINTENANCE LETTER (OUTPATIENT)
Age: 34
End: 2024-12-14